# Patient Record
Sex: FEMALE | Race: WHITE | NOT HISPANIC OR LATINO
[De-identification: names, ages, dates, MRNs, and addresses within clinical notes are randomized per-mention and may not be internally consistent; named-entity substitution may affect disease eponyms.]

---

## 2018-05-16 PROBLEM — Z00.00 ENCOUNTER FOR PREVENTIVE HEALTH EXAMINATION: Status: ACTIVE | Noted: 2018-05-16

## 2018-05-23 ENCOUNTER — APPOINTMENT (OUTPATIENT)
Dept: UROLOGY | Facility: CLINIC | Age: 83
End: 2018-05-23
Payer: MEDICARE

## 2018-05-23 VITALS
WEIGHT: 121 LBS | DIASTOLIC BLOOD PRESSURE: 74 MMHG | SYSTOLIC BLOOD PRESSURE: 183 MMHG | HEART RATE: 56 BPM | BODY MASS INDEX: 22.26 KG/M2 | HEIGHT: 62 IN

## 2018-05-23 DIAGNOSIS — R35.1 NOCTURIA: ICD-10-CM

## 2018-05-23 DIAGNOSIS — Z78.9 OTHER SPECIFIED HEALTH STATUS: ICD-10-CM

## 2018-05-23 DIAGNOSIS — R35.0 FREQUENCY OF MICTURITION: ICD-10-CM

## 2018-05-23 LAB
BILIRUB UR QL STRIP: NORMAL
CLARITY UR: CLEAR
COLLECTION METHOD: NORMAL
GLUCOSE UR-MCNC: NORMAL
HCG UR QL: 2 EU/DL
HGB UR QL STRIP.AUTO: NORMAL
KETONES UR-MCNC: NORMAL
LEUKOCYTE ESTERASE UR QL STRIP: NORMAL
NITRITE UR QL STRIP: NORMAL
PH UR STRIP: 6.5
PROT UR STRIP-MCNC: NORMAL
SP GR UR STRIP: 1.01

## 2018-05-23 PROCEDURE — 81003 URINALYSIS AUTO W/O SCOPE: CPT | Mod: QW

## 2018-05-23 PROCEDURE — 99213 OFFICE O/P EST LOW 20 MIN: CPT

## 2018-05-23 RX ORDER — METOPROLOL SUCCINATE 100 MG/1
100 TABLET, EXTENDED RELEASE ORAL
Qty: 90 | Refills: 0 | Status: ACTIVE | COMMUNITY
Start: 2018-01-30

## 2018-05-23 RX ORDER — CALCIUM CARB/VIT D3/MINERALS 600 MG-400
600-400 TABLET ORAL
Refills: 0 | Status: ACTIVE | COMMUNITY

## 2018-05-23 RX ORDER — RAMIPRIL 10 MG/1
10 CAPSULE ORAL
Qty: 90 | Refills: 0 | Status: ACTIVE | COMMUNITY
Start: 2018-01-30

## 2018-05-23 RX ORDER — SOLIFENACIN SUCCINATE 10 MG/1
10 TABLET, FILM COATED ORAL
Qty: 90 | Refills: 0 | Status: ACTIVE | COMMUNITY
Start: 2017-01-31

## 2018-05-23 RX ORDER — ATORVASTATIN CALCIUM 20 MG/1
20 TABLET, FILM COATED ORAL
Qty: 90 | Refills: 0 | Status: ACTIVE | COMMUNITY
Start: 2017-09-05

## 2018-08-22 ENCOUNTER — APPOINTMENT (OUTPATIENT)
Dept: UROLOGY | Facility: CLINIC | Age: 83
End: 2018-08-22
Payer: MEDICARE

## 2018-08-22 VITALS
WEIGHT: 121 LBS | SYSTOLIC BLOOD PRESSURE: 189 MMHG | HEIGHT: 62 IN | BODY MASS INDEX: 22.26 KG/M2 | HEART RATE: 56 BPM | DIASTOLIC BLOOD PRESSURE: 83 MMHG

## 2018-08-22 DIAGNOSIS — N32.81 OVERACTIVE BLADDER: ICD-10-CM

## 2018-08-22 LAB
BILIRUB UR QL STRIP: NORMAL
CLARITY UR: CLEAR
COLLECTION METHOD: NORMAL
GLUCOSE UR-MCNC: NORMAL
HCG UR QL: NORMAL EU/DL
HGB UR QL STRIP.AUTO: NORMAL
KETONES UR-MCNC: NORMAL
LEUKOCYTE ESTERASE UR QL STRIP: NORMAL
NITRITE UR QL STRIP: NORMAL
PH UR STRIP: 7
PROT UR STRIP-MCNC: NORMAL
SP GR UR STRIP: 1

## 2018-08-22 PROCEDURE — 99213 OFFICE O/P EST LOW 20 MIN: CPT

## 2018-08-22 PROCEDURE — 81003 URINALYSIS AUTO W/O SCOPE: CPT | Mod: QW

## 2018-08-22 PROCEDURE — 51798 US URINE CAPACITY MEASURE: CPT

## 2018-10-14 ENCOUNTER — INPATIENT (INPATIENT)
Facility: HOSPITAL | Age: 83
LOS: 17 days | Discharge: NURSING, REHAB OR LONG-TERM CARE | End: 2018-11-01
Attending: INTERNAL MEDICINE | Admitting: INTERNAL MEDICINE

## 2018-10-14 VITALS
RESPIRATION RATE: 18 BRPM | OXYGEN SATURATION: 96 % | SYSTOLIC BLOOD PRESSURE: 141 MMHG | TEMPERATURE: 99 F | DIASTOLIC BLOOD PRESSURE: 63 MMHG | HEART RATE: 74 BPM

## 2018-10-14 DIAGNOSIS — R09.89 OTHER SPECIFIED SYMPTOMS AND SIGNS INVOLVING THE CIRCULATORY AND RESPIRATORY SYSTEMS: ICD-10-CM

## 2018-10-14 LAB
ALBUMIN SERPL ELPH-MCNC: 3.1 G/DL — LOW (ref 3.5–5.2)
ALP SERPL-CCNC: 77 U/L — SIGNIFICANT CHANGE UP (ref 30–115)
ALT FLD-CCNC: 15 U/L — SIGNIFICANT CHANGE UP (ref 0–41)
ANION GAP SERPL CALC-SCNC: 13 MMOL/L — SIGNIFICANT CHANGE UP (ref 7–14)
APPEARANCE UR: ABNORMAL
AST SERPL-CCNC: 14 U/L — SIGNIFICANT CHANGE UP (ref 0–41)
BACTERIA # UR AUTO: ABNORMAL /HPF
BASOPHILS # BLD AUTO: 0.02 K/UL — SIGNIFICANT CHANGE UP (ref 0–0.2)
BASOPHILS NFR BLD AUTO: 0.3 % — SIGNIFICANT CHANGE UP (ref 0–1)
BILIRUB SERPL-MCNC: 0.4 MG/DL — SIGNIFICANT CHANGE UP (ref 0.2–1.2)
BILIRUB UR-MCNC: NEGATIVE — SIGNIFICANT CHANGE UP
BUN SERPL-MCNC: 12 MG/DL — SIGNIFICANT CHANGE UP (ref 10–20)
CALCIUM SERPL-MCNC: 8.5 MG/DL — SIGNIFICANT CHANGE UP (ref 8.5–10.1)
CHLORIDE SERPL-SCNC: 92 MMOL/L — LOW (ref 98–110)
CO2 SERPL-SCNC: 24 MMOL/L — SIGNIFICANT CHANGE UP (ref 17–32)
COLOR SPEC: YELLOW — SIGNIFICANT CHANGE UP
CREAT SERPL-MCNC: 0.5 MG/DL — LOW (ref 0.7–1.5)
DIFF PNL FLD: NEGATIVE — SIGNIFICANT CHANGE UP
EOSINOPHIL # BLD AUTO: 0 K/UL — SIGNIFICANT CHANGE UP (ref 0–0.7)
EOSINOPHIL NFR BLD AUTO: 0 % — SIGNIFICANT CHANGE UP (ref 0–8)
EPI CELLS # UR: ABNORMAL /HPF
ERYTHROCYTE [SEDIMENTATION RATE] IN BLOOD: 61 MM/HR — HIGH (ref 0–20)
GLUCOSE SERPL-MCNC: 173 MG/DL — HIGH (ref 70–99)
GLUCOSE UR QL: NEGATIVE MG/DL — SIGNIFICANT CHANGE UP
HCT VFR BLD CALC: 35.8 % — LOW (ref 37–47)
HGB BLD-MCNC: 12.4 G/DL — SIGNIFICANT CHANGE UP (ref 12–16)
IMM GRANULOCYTES NFR BLD AUTO: 1.1 % — HIGH (ref 0.1–0.3)
KETONES UR-MCNC: NEGATIVE — SIGNIFICANT CHANGE UP
LEUKOCYTE ESTERASE UR-ACNC: NEGATIVE — SIGNIFICANT CHANGE UP
LYMPHOCYTES # BLD AUTO: 0.53 K/UL — LOW (ref 1.2–3.4)
LYMPHOCYTES # BLD AUTO: 8.6 % — LOW (ref 20.5–51.1)
MAGNESIUM SERPL-MCNC: 2.2 MG/DL — SIGNIFICANT CHANGE UP (ref 1.8–2.4)
MCHC RBC-ENTMCNC: 28.8 PG — SIGNIFICANT CHANGE UP (ref 27–31)
MCHC RBC-ENTMCNC: 34.6 G/DL — SIGNIFICANT CHANGE UP (ref 32–37)
MCV RBC AUTO: 83.3 FL — SIGNIFICANT CHANGE UP (ref 81–99)
MONOCYTES # BLD AUTO: 0.7 K/UL — HIGH (ref 0.1–0.6)
MONOCYTES NFR BLD AUTO: 11.4 % — HIGH (ref 1.7–9.3)
NEUTROPHILS # BLD AUTO: 4.83 K/UL — SIGNIFICANT CHANGE UP (ref 1.4–6.5)
NEUTROPHILS NFR BLD AUTO: 78.6 % — HIGH (ref 42.2–75.2)
NITRITE UR-MCNC: NEGATIVE — SIGNIFICANT CHANGE UP
NRBC # BLD: 0 /100 WBCS — SIGNIFICANT CHANGE UP (ref 0–0)
PH UR: 7 — SIGNIFICANT CHANGE UP (ref 5–8)
PLATELET # BLD AUTO: 287 K/UL — SIGNIFICANT CHANGE UP (ref 130–400)
POTASSIUM SERPL-MCNC: 3.8 MMOL/L — SIGNIFICANT CHANGE UP (ref 3.5–5)
POTASSIUM SERPL-SCNC: 3.8 MMOL/L — SIGNIFICANT CHANGE UP (ref 3.5–5)
PROT SERPL-MCNC: 5.6 G/DL — LOW (ref 6–8)
PROT UR-MCNC: NEGATIVE MG/DL — SIGNIFICANT CHANGE UP
RBC # BLD: 4.3 M/UL — SIGNIFICANT CHANGE UP (ref 4.2–5.4)
RBC # FLD: 13.5 % — SIGNIFICANT CHANGE UP (ref 11.5–14.5)
SODIUM SERPL-SCNC: 129 MMOL/L — LOW (ref 135–146)
SP GR SPEC: 1.01 — SIGNIFICANT CHANGE UP (ref 1.01–1.03)
UROBILINOGEN FLD QL: 1 MG/DL (ref 0.2–0.2)
WBC # BLD: 6.15 K/UL — SIGNIFICANT CHANGE UP (ref 4.8–10.8)
WBC # FLD AUTO: 6.15 K/UL — SIGNIFICANT CHANGE UP (ref 4.8–10.8)

## 2018-10-14 RX ORDER — HEPARIN SODIUM 5000 [USP'U]/ML
5000 INJECTION INTRAVENOUS; SUBCUTANEOUS EVERY 8 HOURS
Qty: 0 | Refills: 0 | Status: DISCONTINUED | OUTPATIENT
Start: 2018-10-14 | End: 2018-11-01

## 2018-10-14 RX ORDER — ATORVASTATIN CALCIUM 80 MG/1
1 TABLET, FILM COATED ORAL
Qty: 0 | Refills: 0 | COMMUNITY

## 2018-10-14 RX ORDER — DOCUSATE SODIUM 100 MG
1 CAPSULE ORAL
Qty: 0 | Refills: 0 | COMMUNITY

## 2018-10-14 RX ORDER — METOPROLOL TARTRATE 50 MG
1 TABLET ORAL
Qty: 0 | Refills: 0 | COMMUNITY

## 2018-10-14 RX ORDER — DOCUSATE SODIUM 100 MG
100 CAPSULE ORAL
Qty: 0 | Refills: 0 | Status: DISCONTINUED | OUTPATIENT
Start: 2018-10-14 | End: 2018-10-27

## 2018-10-14 RX ORDER — METOPROLOL TARTRATE 50 MG
50 TABLET ORAL DAILY
Qty: 0 | Refills: 0 | Status: DISCONTINUED | OUTPATIENT
Start: 2018-10-14 | End: 2018-11-01

## 2018-10-14 RX ORDER — ASPIRIN/CALCIUM CARB/MAGNESIUM 324 MG
1 TABLET ORAL
Qty: 0 | Refills: 0 | COMMUNITY

## 2018-10-14 RX ORDER — ATORVASTATIN CALCIUM 80 MG/1
40 TABLET, FILM COATED ORAL AT BEDTIME
Qty: 0 | Refills: 0 | Status: DISCONTINUED | OUTPATIENT
Start: 2018-10-14 | End: 2018-11-01

## 2018-10-14 RX ORDER — LISINOPRIL 2.5 MG/1
40 TABLET ORAL DAILY
Qty: 0 | Refills: 0 | Status: DISCONTINUED | OUTPATIENT
Start: 2018-10-14 | End: 2018-11-01

## 2018-10-14 RX ORDER — ASPIRIN/CALCIUM CARB/MAGNESIUM 324 MG
81 TABLET ORAL DAILY
Qty: 0 | Refills: 0 | Status: DISCONTINUED | OUTPATIENT
Start: 2018-10-14 | End: 2018-11-01

## 2018-10-14 RX ADMIN — ATORVASTATIN CALCIUM 40 MILLIGRAM(S): 80 TABLET, FILM COATED ORAL at 21:31

## 2018-10-14 RX ADMIN — HEPARIN SODIUM 5000 UNIT(S): 5000 INJECTION INTRAVENOUS; SUBCUTANEOUS at 21:31

## 2018-10-14 RX ADMIN — HEPARIN SODIUM 5000 UNIT(S): 5000 INJECTION INTRAVENOUS; SUBCUTANEOUS at 14:03

## 2018-10-14 RX ADMIN — Medication 81 MILLIGRAM(S): at 12:07

## 2018-10-14 NOTE — H&P ADULT - HISTORY OF PRESENT ILLNESS
89 Y/O was in [facility] (((I went to see the patient this night 4:45 am, patient is barely responsive, everything written here is the information I got from the old chart from the [facility].)))    88 Y/O was in [facility] with PMH HTN, Hyperlipidemia with a c/o of slurred speech, personality changes and 2 falls. CT head showed decreased density in the right frontal white matter which is likely related to chronic ischemic changes.    CT angiogram was unremarkable    MRI of the brain shows nultifocal signal abnormalities with suggestion of the cortical involvement. Findings are atypical for small vessel disease. Inflammatory etiology should be suspected. (((I went to see the patient this night 4:45 am, patient is barely responsive, everything written here is the information I got from the old chart from the [facility].)))(Talked with the daughter also regarding this.)    89 Y/O  with PMH of HTN, Hyperlipidemia went to the ED of [facility] for constipation. Manual disimpaction was done and patient was sent home. Next day patient's personality started changing, Became angry on , a bit combative, went to pmd where ct head was ordered. The Ct showed concern for a brain mass. She was readmitted to [facility] for further workup. Repeat CT head: decreased density in right frontal white matter which is likely related to chronic ischemic change. CT angiogram was unremarkable  MRI of the brain showed multifocal subcortical signal abnormalities with suggestion of cortical involvement. Findings are atypical for small vessel disease. Inflammatory etiology should be suspected. No mass.   urinalysis was done. Mildly positive for uti. She was started on levofloxacin but it was stopped due to a negative urine culture.   Diagnosis of metabolic encephalopathy prob 2/2 to uti was made. slurred speech resolved. (possible tia) , hypokalemia was corrected. LP was done but result has not been mentioned in papers. ECHO showed EF 75-80%, mod AS, mild AR, TR. EEG showed generalized slowing   As per daughter before this episode patient was AAOx3 and they were not satisfied with the workup in [facility] so she was transferred to [facility].

## 2018-10-14 NOTE — H&P ADULT - ASSESSMENT
89 y/o f is here is here from [facility] for constipation and developed altered mental status.    AMS 2/2 metabolic encephalopathy vs severe depression  -ct scan, ct angio and mri negative for mass  -repeat urinalysis and urine culture  -repeat blood culture  -repeat cxr  -psychology consult.  -neurology consult for ams  -EEG  -check ESR, CRP and ANDRIY  -FTA-ABS is negative  -f/u [facility] for LP and other results    DLD  -c/w atorvastatin    constipation  -c/w docusate    HTN  -controlled. c/w lisinopril and metoprolol    DVT ppx  -hep sub q

## 2018-10-14 NOTE — H&P ADULT - ATTENDING COMMENTS
Patient seen and examined independently. Resident's H & P reviewed. Agree with the findings and plan of care except,    An 89 years old female was transferred from the [facility]. Pt was admitted over there for altered mental status and had complete W/U done including LP. Old record from [facility] reviewed. Only +ve finding was questionable inflammation on brain MRI.    Today O/E: AA+Ox3  CBC wnl. BMP show hyponatremia    ASSESSMENT:    1. Encephalopathy unspecified: resolving  2. Hyponatremia  3. HTN/DL    PLAN:    . Neuro eval  . F/U on the LP test results done in [facility]  . Rehab eval  . CXR  . OOB to chair  . Plan of care D/W the family in detail

## 2018-10-14 NOTE — PROGRESS NOTE ADULT - ASSESSMENT
(((I went to see the patient this night 4:45 am, patient is barely responsive, everything written here is the information I got from the old chart from the [facility].)))(Talked with the daughter also regarding this.)    89 Y/O  with PMH of HTN, Hyperlipidemia went to the ED of [facility] for constipation. Manual disimpaction was done and patient was sent home. Next day patient's personality started changing, Became angry on , a bit combative, went to pmd where ct head was ordered. The Ct showed concern for a brain mass. She was readmitted to [facility] for further workup. Repeat CT head: decreased density in right frontal white matter which is likely related to chronic ischemic change. CT angiogram was unremarkable  MRI of the brain showed multifocal subcortical signal abnormalities with suggestion of cortical involvement. Findings are atypical for small vessel disease. Inflammatory etiology should be suspected. No mass.   urinalysis was done. Mildly positive for uti. She was started on levofloxacin but it was stopped due to a negative urine culture.   Diagnosis of metabolic encephalopathy prob 2/2 to uti was made. slurred speech resolved. (possible tia) , hypokalemia was corrected. LP was done but result has not been mentioned in papers. ECHO showed EF 75-80%, mod AS, mild AR, TR. EEG showed generalized slowing   As per daughter before this episode patient was AAOx3 and they were not satisfied with the workup in [facility] so she was transferred to [facility]. (14 Oct 2018 04:53)      #AMS 2/2 metabolic encephalopathy vs severe depression vs CVA vs  seizure  -ct scan, ct angio and mri negative for mass  -repeat urinalysis and urine culture  -repeat blood culture  -repeat cxr  -psychology consult.  -neurology consult for ams  -EEG  -check ESR, CRP and ANDRIY  -FTA-ABS is negative  -f/u [facility] for LP and other results    #DLD  -c/w atorvastatin    constipation  -c/w docusate    HTN  -controlled. c/w lisinopril and metoprolol    DVT ppx  -hep sub q (((I went to see the patient this night 4:45 am, patient is barely responsive, everything written here is the information I got from the old chart from the [facility].)))(Talked with the daughter also regarding this.)    89 Y/O  with PMH of HTN, Hyperlipidemia went to the ED of [facility] for constipation. Manual disimpaction was done and patient was sent home. Next day patient's personality started changing, Became angry on , a bit combative, went to pmd where ct head was ordered. The Ct showed concern for a brain mass. She was readmitted to [facility] for further workup. Repeat CT head: decreased density in right frontal white matter which is likely related to chronic ischemic change. CT angiogram was unremarkable  MRI of the brain showed multifocal subcortical signal abnormalities with suggestion of cortical involvement. Findings are atypical for small vessel disease. Inflammatory etiology should be suspected. No mass.   urinalysis was done. Mildly positive for uti. She was started on levofloxacin but it was stopped due to a negative urine culture.   Diagnosis of metabolic encephalopathy prob 2/2 to uti was made. slurred speech resolved. (possible tia) , hypokalemia was corrected. LP was done but result has not been mentioned in papers. ECHO showed EF 75-80%, mod AS, mild AR, TR. EEG showed generalized slowing   As per daughter before this episode patient was AAOx3 and they were not satisfied with the workup in [facility] so she was transferred to [facility]. (14 Oct 2018 04:53)      #AMS 2/2 metabolic encephalopathy vs severe depression vs CVA vs  seizure  -ct scan, ct angio and mri negative for mass  -repeat urinalysis and urine culture  -repeat blood culture  -repeat cxr  -psychology consult.  -neurology consult for ams  -EEG  -check ESR, CRP and ANDRIY  -FTA-ABS is negative  -f/u [facility] for LP and other results    #DLD  -c/w atorvastatin    constipation  -c/w docusate    HTN  -controlled. c/w lisinopril and metoprolol    DVT ppx  -hep sub q      PMD- Dr. Annmarie Ballard [phone]  Cardiologist - Dr Shah [phone]  ID - Dr. Sue Ferreira [phone]

## 2018-10-14 NOTE — H&P ADULT - NSHPLABSRESULTS_GEN_ALL_CORE
10/12/2018.    HB - 12.9  WBC- 7.7  PLT- 274    Na - 133  K- 3.8  CL - 97  CO2 - 26  BUN - 13  CREATININE - 0.4  GLUCOSE - 87

## 2018-10-14 NOTE — PROGRESS NOTE ADULT - SUBJECTIVE AND OBJECTIVE BOX
SUBJECTIVE:  Patient is a 89y old Female who presents with a chief complaint of altered mental status (14 Oct 2018 04:53)  Currently admitted to medicine with the primary diagnosis of Altered mental status    INTERVAL HISTORY;  Today is hospital day 01 . This morning she is resting comfortably in bed and reports no new issues or overnight events.     PAST MEDICAL & SURGICAL HISTORY  Hyperlipidemia  HTN (hypertension)  No significant past surgical history    SOCIAL HISTORY:  Negative for smoking/alcohol/drug use.     ALLERGIES:  No Known Allergies    MEDICATIONS:  STANDING MEDICATIONS  aspirin  chewable 81 milliGRAM(s) Oral daily  atorvastatin 40 milliGRAM(s) Oral at bedtime  docusate sodium 100 milliGRAM(s) Oral two times a day  heparin  Injectable 5000 Unit(s) SubCutaneous every 8 hours  lisinopril 40 milliGRAM(s) Oral daily  metoprolol succinate ER 50 milliGRAM(s) Oral daily    PRN MEDICATIONS    Home Medications:  aspirin 81 mg oral tablet: 1 tab(s) orally once a day (14 Oct 2018 06:09)  atorvastatin 40 mg oral tablet: 1 tab(s) orally once a day (at bedtime) (14 Oct 2018 06:09)  docusate sodium 100 mg oral tablet: 1 tab(s) orally 2 times a day (14 Oct 2018 06:09)  metoprolol succinate 50 mg oral tablet, extended release: 1 tab(s) orally once a day (14 Oct 2018 06:09)  ramipril 10 mg oral tablet: 1 tab(s) orally 2 times a day (14 Oct 2018 06:09)      VITALS:   T(F): 99.1  HR: 80  BP: 110/55  RR: 19  SpO2: 96%    LABS:                        12.4   6.15  )-----------( 287      ( 14 Oct 2018 12:01 )             35.8     10-14    129<L>  |  92<L>  |  12  ----------------------------<  173<H>  3.8   |  24  |  0.5<L>    Ca    8.5      14 Oct 2018 12:01  Mg     2.2     10-14    TPro  5.6<L>  /  Alb  3.1<L>  /  TBili  0.4  /  DBili  x   /  AST  14  /  ALT  15  /  AlkPhos  77  10-1    Sedimentation Rate, Erythrocyte: 61 mm/hr <H> (10-14-18 @ 12:01)    RADIOLOGY:        PHYSICAL EXAM:  GEN: No acute distress  LUNGS: Clear to auscultation bilaterally   HEART: S1/S2 present. RRR.   ABD: Soft, non-tender, non-distended. Bowel sounds present  EXT: NC/NC/NE/2+PP/TEJEDA/Skin Intact.   NEURO: AAOX3, strength 4/5 Right upper extremity, Right arm drift??, left sided facial droop, bilateral plantar response, intact sensation bilaterally. LE - pt is only moving legs and slightly lifts the legs but does not follow commands. Horizontal eye movements - limited eye movement to left on horizontal plane, vertical movements are fine. PERRLA,

## 2018-10-14 NOTE — H&P ADULT - NSHPPHYSICALEXAM_GEN_ALL_CORE
T(F): --99.2  HR: --74  BP: --141/63  RR: -- 20  SpO2: -- 96 %  PHYSICAL EXAM:  GENERAL: NAD, Delay in thought processing, no signs of respiratory distress  HEAD:  Atraumatic, Normocephalic  EYES: EOMI, PERRLA, conjunctiva and sclera clear  CHEST/LUNG: Clear to auscultation bilaterally; No wheeze; No crackles; No accessory muscles used  HEART: Regular rate and rhythm; No murmurs;   ABDOMEN: Soft, Nontender, Nondistended; No guarding  EXTREMITIES:  No cyanosis or edema  PSYCH: AAOx1  NEUROLOGY: non-focal  SKIN: No rashes or lesions T(F): --99.2  HR: --74  BP: --141/63  RR: -- 20  SpO2: -- 96 %  PHYSICAL EXAM:  GENERAL: NAD, Delay in thought processing, no signs of respiratory distress  HEAD:  Atraumatic, Normocephalic  EYES: EOMI, PERRLA, conjunctiva and sclera clear  CHEST/LUNG: Clear to auscultation bilaterally; No wheeze; No crackles; No accessory muscles used  HEART/CVS: Regular rate and rhythm; No murmurs;   ABDOMEN/GI: Soft, Nontender, Nondistended; No guarding  EXTREMITIES:  No cyanosis or edema  PSYCH: AAOx3  NEUROLOGY: non-focal  SKIN: No rashes or lesions

## 2018-10-15 LAB
CRP SERPL-MCNC: 6.68 MG/DL — HIGH (ref 0–0.4)
T3 SERPL-MCNC: 59 NG/DL — LOW (ref 80–200)
T4 AB SER-ACNC: 7 UG/DL — SIGNIFICANT CHANGE UP (ref 4.6–12)
TSH SERPL-MCNC: 2.94 UIU/ML — SIGNIFICANT CHANGE UP (ref 0.27–4.2)

## 2018-10-15 RX ORDER — POLYETHYLENE GLYCOL 3350 17 G/17G
17 POWDER, FOR SOLUTION ORAL EVERY 12 HOURS
Qty: 0 | Refills: 0 | Status: DISCONTINUED | OUTPATIENT
Start: 2018-10-15 | End: 2018-11-01

## 2018-10-15 RX ORDER — ACETAMINOPHEN 500 MG
650 TABLET ORAL EVERY 6 HOURS
Qty: 0 | Refills: 0 | Status: DISCONTINUED | OUTPATIENT
Start: 2018-10-15 | End: 2018-11-01

## 2018-10-15 RX ADMIN — POLYETHYLENE GLYCOL 3350 17 GRAM(S): 17 POWDER, FOR SOLUTION ORAL at 17:04

## 2018-10-15 RX ADMIN — ATORVASTATIN CALCIUM 40 MILLIGRAM(S): 80 TABLET, FILM COATED ORAL at 21:26

## 2018-10-15 RX ADMIN — Medication 650 MILLIGRAM(S): at 06:31

## 2018-10-15 RX ADMIN — HEPARIN SODIUM 5000 UNIT(S): 5000 INJECTION INTRAVENOUS; SUBCUTANEOUS at 13:05

## 2018-10-15 RX ADMIN — HEPARIN SODIUM 5000 UNIT(S): 5000 INJECTION INTRAVENOUS; SUBCUTANEOUS at 21:26

## 2018-10-15 RX ADMIN — Medication 650 MILLIGRAM(S): at 07:39

## 2018-10-15 RX ADMIN — Medication 50 MILLIGRAM(S): at 05:59

## 2018-10-15 RX ADMIN — HEPARIN SODIUM 5000 UNIT(S): 5000 INJECTION INTRAVENOUS; SUBCUTANEOUS at 05:59

## 2018-10-15 RX ADMIN — Medication 81 MILLIGRAM(S): at 11:01

## 2018-10-15 RX ADMIN — Medication 100 MILLIGRAM(S): at 17:04

## 2018-10-15 RX ADMIN — LISINOPRIL 40 MILLIGRAM(S): 2.5 TABLET ORAL at 05:59

## 2018-10-15 NOTE — PROGRESS NOTE ADULT - ASSESSMENT
#AMS 2/2 metabolic encephalopathy vs severe depression vs CVA vs  seizure  -ct scan, ct angio and mri negative for mass - ALL DONE AT [facility]  -urinalysis is negative for acute uti 10/14  -blood culture - Pending  -ESR 61, CRP 6.68  -FTA-ABS is negative  -Serum Na 129 10/14  -psychology consult - they saw the pt 10/14 and would see again once medical causes are excluded  -neurology consult for ams - pending  -EEG - ordered not performed yet  -f/u [facility] for LP and other results    #DLD  -c/w atorvastatin    constipation  -c/w docusate    HTN  -controlled. c/w lisinopril and metoprolol    DVT ppx  -hep sub q      PMD- Dr. Annmarie Ballard [phone]  Cardiologist - Dr Shah [phone]  ID - Dr. Sue Ferreira [phone]

## 2018-10-15 NOTE — SWALLOW BEDSIDE ASSESSMENT ADULT - SWALLOW EVAL: DIAGNOSIS
+moderate oral dysphagia for thin w/ +bolus hold, mild for nectar and puree +toleration of nectar and puree w/ no overt s/s of penetration/aspiration

## 2018-10-15 NOTE — CONSULT NOTE ADULT - ASSESSMENT
Assessment:   An 89 years old female w/ history of HTN, DLD was transferred from [facility]. Pt was admitted over there for altered mental status and had complete W/U done including LP. Old record from [facility] reviewed. Only +ve finding was questionable inflammation on brain MRI. Patient found to have motor deficits in the Left arm and leg compared to the right, however unsure what portion of that is due to current mental status. Workup for metabolic cause for change in mental status.     Plan:   1. f/u UA, Ucx, Bcx   2. f/u EEG   3. check ESR, CRP and ANDRIY, B12, folate, TSH  4. give thiamine   5. f/u [facility] for LP and other results  6. neurology attending to follow

## 2018-10-15 NOTE — SWALLOW BEDSIDE ASSESSMENT ADULT - SLP PERTINENT HISTORY OF CURRENT PROBLEM
Pt admitted from [facility] after w/u for CVA.  CTH: dec density R frontal WM MRI: multifocal subcortical signal abnormalities suggestive of cortical involvement

## 2018-10-15 NOTE — CONSULT NOTE ADULT - SUBJECTIVE AND OBJECTIVE BOX
HPI    89 Y/O  with PMH of HTN, Hyperlipidemia went to the ED of [facility] for constipation. Manual disimpaction was done and patient was sent home. Next day patient's personality started changing, Became angry on , a bit combative, went to pmd where ct head was ordered. The Ct showed concern for a brain mass. She was readmitted to [facility] for further workup. Repeat CT head: decreased density in right frontal white matter which is likely related to chronic ischemic change. CT angiogram was unremarkable  MRI of the brain showed multifocal subcortical signal abnormalities with suggestion of cortical involvement. Findings are atypical for small vessel disease. Inflammatory etiology should be suspected. No mass.   urinalysis was done. Mildly positive for uti. She was started on levofloxacin but it was stopped due to a negative urine culture.   Diagnosis of metabolic encephalopathy prob 2/2 to uti was made. slurred speech resolved. (possible tia) , hypokalemia was corrected. LP was done but result has not been mentioned in papers. ECHO showed EF 75-80%, mod AS, mild AR, TR. EEG showed generalized slowing   As per daughter before this episode patient was AAOx3 and they were not satisfied with the workup in [facility] so she was transferred to [facility]. (14 Oct 2018 04:53)      PAST MEDICAL & SURGICAL HISTORY:  Hyperlipidemia  HTN (hypertension)  No significant past surgical history      Hospital Course: Patient seen by SLP for swallow eval. Rec pureed die t w/ nectar thick liquid sec to dysphagia. Patient has been lethargic but out of bed to chair and reportedly feeding herself.     TODAY'S SUBJECTIVE & REVIEW OF SYMPTOMS: unable sec to lethargy      MEDICATIONS  (STANDING):  aspirin  chewable 81 milliGRAM(s) Oral daily  atorvastatin 40 milliGRAM(s) Oral at bedtime  docusate sodium 100 milliGRAM(s) Oral two times a day  heparin  Injectable 5000 Unit(s) SubCutaneous every 8 hours  lisinopril 40 milliGRAM(s) Oral daily  metoprolol succinate ER 50 milliGRAM(s) Oral daily    MEDICATIONS  (PRN):  acetaminophen   Tablet .. 650 milliGRAM(s) Oral every 6 hours PRN Moderate Pain (4 - 6)      FAMILY HISTORY:  No pertinent family history in first degree relatives: No relavent FHX related to the pt&#x27;s current condition      Allergies    No Known Allergies    Intolerances        SOCIAL HISTORY: unkown. Unable to give history    [  ] Etoh  [  ] Smoking  [  ] Substance abuse     Home Environment:  [  ] Home Alone  [  ] Lives with Family  [  ] Home Health Aid    Dwelling:  [  ] Apartment  [  ] Private House  [  ] Adult Home  [  ] Skilled Nursing Facility      [  ] Short Term  [  ] Long Term  [  ] Stairs       Elevator [  ]    FUNCTIONAL STATUS PTA: (Check all that apply) - unkown  Ambulation: [   ]Independent    [  ] Dependent     [  ] Non-Ambulatory  Assistive Device: [  ] SA Cane  [  ]  Q Cane  [  ] Walker  [  ]  Wheelchair  ADL : [  ] Independent  [  ]  Dependent       Vital Signs Last 24 Hrs  T(C): 36.3 (15 Oct 2018 07:16), Max: 36.7 (15 Oct 2018 00:55)  T(F): 97.3 (15 Oct 2018 07:16), Max: 98.1 (15 Oct 2018 00:55)  HR: 74 (15 Oct 2018 07:16) (74 - 86)  BP: 146/65 (15 Oct 2018 07:16) (135/62 - 179/84)  BP(mean): --  RR: 18 (15 Oct 2018 07:16) (18 - 18)  SpO2: 95% (15 Oct 2018 07:40) (95% - 95%)      PHYSICAL EXAM: Lethargic  GENERAL: thin  HEAD:  Atraumatic, Normocephalic  EYES: EOMI, PERRLA, conjunctiva and sclera clear  NECK: Supple, No JVD,   CHEST/LUNG: Clear to percussion bilaterally; No rales, rhonchi, wheezing, or rubs  HEART: Regular rate and rhythm; No murmurs, rubs, or gallops  ABDOMEN: Soft, Nontender, Nondistended  EXTREMITIES: no edema    NERVOUS SYSTEM:  Cranial Nerves 2-12  grossly intact [x  ] Abnormal  [  ]  ROM: WFL all extremities [x  ]  Abnormal [  ]  Motor Strength: WFL all extremities  [  ]  Abnormal [  ] unable. left side weaker than right  Sensation: intact to light touch [  ] Abnormal [  ] unable  Reflexes: Symmetric [x  ]  Abnormal [  ]    FUNCTIONAL STATUS:  Bed Mobility: Independent [  ]  Supervision [x  ]  Needs Assistance [  ]  N/A [  ]  Transfers: Independent [  ]  Supervision [x  ]  Needs Assistance [  ]  N/A [  ]   Ambulation: Independent [  ]  Supervision [x  ]  Needs Assistance [  ]  N/A [  ]  ADL: Independent [  ] Requires Assistance [ x ] N/A [  ]      LABS:                        12.4   6.15  )-----------( 287      ( 14 Oct 2018 12:01 )             35.8     10-14    129<L>  |  92<L>  |  12  ----------------------------<  173<H>  3.8   |  24  |  0.5<L>    Ca    8.5      14 Oct 2018 12:01  Mg     2.2     10-14    TPro  5.6<L>  /  Alb  3.1<L>  /  TBili  0.4  /  DBili  x   /  AST  14  /  ALT  15  /  AlkPhos  77  10-14      Urinalysis Basic - ( 14 Oct 2018 22:02 )    Color: Yellow / Appearance: Cloudy / S.015 / pH: x  Gluc: x / Ketone: Negative  / Bili: Negative / Urobili: 1.0 mg/dL   Blood: x / Protein: Negative mg/dL / Nitrite: Negative   Leuk Esterase: Negative / RBC: x / WBC x   Sq Epi: x / Non Sq Epi: Moderate /HPF / Bacteria: Few /HPF        RADIOLOGY & ADDITIONAL STUDIES:    Assesment:

## 2018-10-15 NOTE — PROGRESS NOTE ADULT - SUBJECTIVE AND OBJECTIVE BOX
(((I went to see the patient this night 4:45 am, patient is barely responsive, everything written here is the information I got from the old chart from the [facility].)))(Talked with the daughter also regarding this.)    89 Y/O  with PMH of HTN, Hyperlipidemia went to the ED of [facility] for constipation. Manual disimpaction was done and patient was sent home. Next day patient's personality started changing, Became angry on , a bit combative, went to pmd where ct head was ordered. The Ct showed concern for a brain mass. She was readmitted to [facility] for further workup. Repeat CT head: decreased density in right frontal white matter which is likely related to chronic ischemic change. CT angiogram was unremarkable  MRI of the brain showed multifocal subcortical signal abnormalities with suggestion of cortical involvement. Findings are atypical for small vessel disease. Inflammatory etiology should be suspected. No mass.   urinalysis was done. Mildly positive for uti. She was started on levofloxacin but it was stopped due to a negative urine culture.   Diagnosis of metabolic encephalopathy prob 2/2 to uti was made. slurred speech resolved. (possible tia) , hypokalemia was corrected. LP was done but result has not been mentioned in papers. ECHO showed EF 75-80%, mod AS, mild AR, TR. EEG showed generalized slowing   As per daughter before this episode patient was AAOx3 and they were not satisfied with the workup in [facility] so she was transferred to [facility]. (14 Oct 2018 04:53)    SUBJECTIVE:  Patient is a 89y old Female who presented with a chief complaint of altered mental status (15 Oct 2018 10:50)  Currently admitted to medicine with the primary diagnosis of Altered mental status likely 2/2 CVA vs metabolic encephalopathy      INTERVAL HISTORY;  Today is hospital day 2d. This morning she is resting comfortably in chair and reports no new issues or overnight events.     PAST MEDICAL & SURGICAL HISTORY  Hyperlipidemia  HTN (hypertension)  No significant past surgical history    SOCIAL HISTORY:  Negative for smoking/alcohol/drug use.     ALLERGIES:  No Known Allergies    MEDICATIONS:  STANDING MEDICATIONS  aspirin  chewable 81 milliGRAM(s) Oral daily  atorvastatin 40 milliGRAM(s) Oral at bedtime  docusate sodium 100 milliGRAM(s) Oral two times a day  heparin  Injectable 5000 Unit(s) SubCutaneous every 8 hours  lisinopril 40 milliGRAM(s) Oral daily  metoprolol succinate ER 50 milliGRAM(s) Oral daily    PRN MEDICATIONS  acetaminophen   Tablet .. 650 milliGRAM(s) Oral every 6 hours PRN    Home Medications:  aspirin 81 mg oral tablet: 1 tab(s) orally once a day (14 Oct 2018 06:09)  atorvastatin 40 mg oral tablet: 1 tab(s) orally once a day (at bedtime) (14 Oct 2018 06:09)  docusate sodium 100 mg oral tablet: 1 tab(s) orally 2 times a day (14 Oct 2018 06:09)  metoprolol succinate 50 mg oral tablet, extended release: 1 tab(s) orally once a day (14 Oct 2018 06:09)  ramipril 10 mg oral tablet: 1 tab(s) orally 2 times a day (14 Oct 2018 06:09)      VITALS:   T(F): 97.3  HR: 74  BP: 146/65  RR: 18  SpO2: 95%    LABS:                        12.4   6.15  )-----------( 287      ( 14 Oct 2018 12:01 )             35.8     10-14    129<L>  |  92<L>  |  12  ----------------------------<  173<H>  3.8   |  24  |  0.5<L>    Ca    8.5      14 Oct 2018 12:01  Mg     2.2     10-14    TPro  5.6<L>  /  Alb  3.1<L>  /  TBili  0.4  /  DBili  x   /  AST  14  /  ALT  15  /  AlkPhos  77  10-14      Urinalysis Basic - ( 14 Oct 2018 22:02 )    Color: Yellow / Appearance: Cloudy / S.015 / pH: x  Gluc: x / Ketone: Negative  / Bili: Negative / Urobili: 1.0 mg/dL   Blood: x / Protein: Negative mg/dL / Nitrite: Negative   Leuk Esterase: Negative / RBC: x / WBC x   Sq Epi: x / Non Sq Epi: Moderate /HPF / Bacteria: Few /HPF      RADIOLOGY:  < from: Xray Chest 1 View- PORTABLE-Routine (10.14.18 @ 10:39) >  Impression:    No radiographic evidence of acute cardiopulmonary disease.        PHYSICAL EXAM:  GEN: No acute distress  LUNGS: Clear to auscultation bilaterally   HEART: S1/S2 present. RRR.   ABD: Soft, non-tender, non-distended. Bowel sounds present  EXT: NC/NC/NE/2+PP/TEJEDA/Skin Intact.   NEURO: AAOX3  Cranial nerves: intact VA, VFF.  EOMI w/o nystagmus,  PERRL.  No ptosis/weakness of eyelid closure.  Left sided facial droop.    Palate elevates midline.  Tongue midline.  Motor examination:   Normal tone, bulk and range of motion.  No tenderness, twitching, tremors or involuntary movements.  Formal Muscle Strength Testing: patient weaker throughout entire left side, 4/5 in Left arm and leg and weaker grasp   Sensory examination: Intact to light touch and pinprick, pain, temperature and proprioception and vibration in all extremities.  Cerebellum: unable to follow commands

## 2018-10-15 NOTE — SWALLOW BEDSIDE ASSESSMENT ADULT - PHARYNGEAL PHASE
Within functional limits Delayed pharyngeal swallow Delayed pharyngeal swallow/Delayed throat clear post oral intake

## 2018-10-15 NOTE — SWALLOW BEDSIDE ASSESSMENT ADULT - COMMENTS
Mild oral dysphagia w/o overt s/s of penetration/aspiration Moderate oral dysphagia w/ min overt s/s of penetration/aspiration

## 2018-10-15 NOTE — CONSULT NOTE ADULT - ASSESSMENT
IMPRESSION: Rehab of change in mental status, left hemiparesis, encephalopathy- r/o infectious vs metabolic vs inflammatory.                      Dysphagia- cleared for pureed food na nectar-thick liquid    PRECAUTIONS: [  ] Cardiac  [  ] Respiratory  [  ] Seizures [  ] Contact Isolation  [  ] Droplet Isolation  [  ] Other    Weight Bearing Status:     RECOMMENDATION:    Out of Bed to Chair     DVT/Decubiti Prophylaxis    REHAB PLAN:     [ x  ] Bedside P/T 3-5 times a week   [   ]   Bedside O/T  2-3 times a week             [   ] No Rehab Therapy Indicated                   [   ]  Speech Therapy   Conditioning/ROM                                    ADL  Bed Mobility                                               Conditioning/ROM  Transfers                                                     Bed Mobility  Sitting /Standing Balance                         Transfers                                        Gait Training                                               Sitting/Standing Balance  Stair Training [   ]Applicable                    Home equipment Eval                                                                        Splinting  [   ] Only      GOALS:   ADL   [   ]   Independent                    Transfers  [   ] Independent                          Ambulation  [   ] Independent     [    ] With device                            [   ]  CG                                                         [   ]  CG                                                                  [   ] CG                            [ x   ] Min A                                                   [ x  ] Min A                                                              [ x  ] Min  A          DISCHARGE PLAN:   [   ]  Good candidate for Intensive Rehabilitation/Hospital based-4A [facility]                                             Will tolerate 3hrs Intensive Rehab Daily                                       [ x   ]  Short Term Rehab in Skilled Nursing Facility                            vs           [  x  ]  Home with Outpatient or VN services                                         [    ]  Possible Candidate for Intensive Hospital based Rehab

## 2018-10-15 NOTE — CONSULT NOTE ADULT - SUBJECTIVE AND OBJECTIVE BOX
Neurology Consult    HPI:  (((I went to see the patient this night 4:45 am, patient is barely responsive, everything written here is the information I got from the old chart from the [facility].)))(Talked with the daughter also regarding this.)    88 Y/O  with PMH of HTN, Hyperlipidemia went to the ED of [facility] for constipation. Manual disimpaction was done and patient was sent home. Next day patient's personality started changing, Became angry on , a bit combative, went to pmd where ct head was ordered. The Ct showed concern for a brain mass. She was readmitted to [facility] for further workup. Repeat CT head: decreased density in right frontal white matter which is likely related to chronic ischemic change. CT angiogram was unremarkable  MRI of the brain showed multifocal subcortical signal abnormalities with suggestion of cortical involvement. Findings are atypical for small vessel disease. Inflammatory etiology should be suspected. No mass.   urinalysis was done. Mildly positive for uti. She was started on levofloxacin but it was stopped due to a negative urine culture.   Diagnosis of metabolic encephalopathy prob 2/2 to uti was made. slurred speech resolved. (possible tia) , hypokalemia was corrected. LP was done but result has not been mentioned in papers. ECHO showed EF 75-80%, mod AS, mild AR, TR. EEG showed generalized slowing   As per daughter before this episode patient was AAOx3 and they were not satisfied with the workup in [facility] so she was transferred to [facility]. (14 Oct 2018 04:53)      Upon neurological evaluation, patient seen in bed, calm and lethargic. Patient oriented to self however otherwise confused. Patient responds to some questions, than will stop responding or go completely quite for remainder of questions. Patient reports that she is constipated. Patient denies any other new complaints. No reported events while in the hospital. Patient not able to fully cooperate with exam.       PAST MEDICAL & SURGICAL HISTORY:  Hyperlipidemia  HTN (hypertension)  No significant past surgical history      FAMILY HISTORY:  No pertinent family history in first degree relatives: No relavent FHX related to the pt&#x27;s current condition      Social History: (-) x 3    Allergies    No Known Allergies    Intolerances        MEDICATIONS  (STANDING):  aspirin  chewable 81 milliGRAM(s) Oral daily  atorvastatin 40 milliGRAM(s) Oral at bedtime  docusate sodium 100 milliGRAM(s) Oral two times a day  heparin  Injectable 5000 Unit(s) SubCutaneous every 8 hours  lisinopril 40 milliGRAM(s) Oral daily  metoprolol succinate ER 50 milliGRAM(s) Oral daily    MEDICATIONS  (PRN):  acetaminophen   Tablet .. 650 milliGRAM(s) Oral every 6 hours PRN Moderate Pain (4 - 6)      Review of systems:    Constitutional: No fever, weight loss or fatigue    Eyes: No eye pain or discharge  ENMT:  No difficulty hearing; No sinus or throat pain  Neck: No pain or stiffness  Respiratory: No cough, wheezing, chills or hemoptysis  Cardiovascular: No chest pain, palpitations, shortness of breath, dyspnea on exertion  Gastrointestinal: No abdominal pain, nausea, vomiting or hematemesis; No diarrhea or constipation.   Genitourinary: No dysuria, frequency, hematuria or incontinence  Neurological: As per HPI  Skin: No rashes or lesions   Endocrine: No heat or cold intolerance; No hair loss  Musculoskeletal: No joint pain or swelling  Psychiatric: No depression, anxiety, mood swings  Heme/Lymph: No easy bruising or bleeding gums    Vital Signs Last 24 Hrs  T(C): 36.3 (15 Oct 2018 07:16), Max: 36.9 (14 Oct 2018 15:38)  T(F): 97.3 (15 Oct 2018 07:16), Max: 98.5 (14 Oct 2018 15:38)  HR: 74 (15 Oct 2018 07:16) (71 - 86)  BP: 146/65 (15 Oct 2018 07:16) (110/55 - 179/84)  BP(mean): --  RR: 18 (15 Oct 2018 07:16) (18 - 18)  SpO2: 95% (15 Oct 2018 07:40) (95% - 95%)    Neurologic Examination:  General:  Appearance is consistent with chronologic age.  No abnormal facies.   General: oriented to person only.     Cranial nerves: intact VA, VFF.  EOMI w/o nystagmus,  PERRL.  No ptosis/weakness of eyelid closure.  Left sided facial droop.    Palate elevates midline.  Tongue midline.  Motor examination:   Normal tone, bulk and range of motion.  No tenderness, twitching, tremors or involuntary movements.  Formal Muscle Strength Testing: patient weaker throughout entire left side, 4/5 in Left arm and leg and weaker grasp   Sensory examination:   Intact to light touch and pinprick, pain, temperature and proprioception and vibration in all extremities.  Cerebellum:  unable to follow commands     Labs:   CBC Full  -  ( 14 Oct 2018 12:01 )  WBC Count : 6.15 K/uL  Hemoglobin : 12.4 g/dL  Hematocrit : 35.8 %  Platelet Count - Automated : 287 K/uL  Mean Cell Volume : 83.3 fL  Mean Cell Hemoglobin : 28.8 pg  Mean Cell Hemoglobin Concentration : 34.6 g/dL  Auto Neutrophil # : 4.83 K/uL  Auto Lymphocyte # : 0.53 K/uL  Auto Monocyte # : 0.70 K/uL  Auto Eosinophil # : 0.00 K/uL  Auto Basophil # : 0.02 K/uL  Auto Neutrophil % : 78.6 %  Auto Lymphocyte % : 8.6 %  Auto Monocyte % : 11.4 %  Auto Eosinophil % : 0.0 %  Auto Basophil % : 0.3 %    10    129<L>  |  92<L>  |  12  ----------------------------<  173<H>  3.8   |  24  |  0.5<L>    Ca    8.5      14 Oct 2018 12:01  Mg     2.2     10-14    TPro  5.6<L>  /  Alb  3.1<L>  /  TBili  0.4  /  DBili  x   /  AST  14  /  ALT  15  /  AlkPhos  77  10-14    LIVER FUNCTIONS - ( 14 Oct 2018 12:01 )  Alb: 3.1 g/dL / Pro: 5.6 g/dL / ALK PHOS: 77 U/L / ALT: 15 U/L / AST: 14 U/L / GGT: x             Urinalysis Basic - ( 14 Oct 2018 22:02 )    Color: Yellow / Appearance: Cloudy / S.015 / pH: x  Gluc: x / Ketone: Negative  / Bili: Negative / Urobili: 1.0 mg/dL   Blood: x / Protein: Negative mg/dL / Nitrite: Negative   Leuk Esterase: Negative / RBC: x / WBC x   Sq Epi: x / Non Sq Epi: Moderate /HPF / Bacteria: Few /HPF          Neuroimaging:  see HPI Neurology Consult    HPI:  89 Y/O  with PMH of HTN, Hyperlipidemia went to the ED of [facility] for constipation on Oct 1 2018. Manual disimpaction was done and patient was sent home. Next day patient's personality started changing, Became angry on , a bit combative, went to pmd where ct head was ordered. The Ct showed concern for a brain mass. She was readmitted to [facility] for further workup. Repeat CT head: decreased density in right frontal white matter which is likely related to chronic ischemic change. CT angiogram was unremarkable  MRI of the brain showed multifocal subcortical signal abnormalities with suggestion of cortical involvement. Findings are atypical for small vessel disease. Inflammatory etiology should be suspected. No mass.   urinalysis was done. Mildly positive for uti. She was started on levofloxacin but it was stopped due to a negative urine culture.   Diagnosis of metabolic encephalopathy prob 2/2 to uti was made. slurred speech resolved. (possible tia) , hypokalemia was corrected. LP was done but result has not been mentioned in papers. ECHO showed EF 75-80%, mod AS, mild AR, TR. EEG showed generalized slowing   As per daughter before this episode patient was AAOx3 and they were not satisfied with the workup in [facility] so she was transferred to [facility]. (14 Oct 2018 04:53)    Daughter reports that for 1 month- patient has been getting increasingly irritable, angry- change in personality- worse in past two weeks- to the point- confuses between two daughters- no spontaneous conversation, answers questions- but not always right    history of >20-30 pound weight loss since april, loss of appetite++    Upon neurological evaluation, patient seen in bed, calm and lethargic. Patient oriented to self however otherwise confused. Patient responds to some questions, than will stop responding or go completely quite for remainder of questions. Patient reports that she is constipated. Patient denies any other new complaints. No reported events while in the hospital. Patient not able to fully cooperate with exam.       PAST MEDICAL & SURGICAL HISTORY:  Hyperlipidemia  HTN (hypertension)  No significant past surgical history      FAMILY HISTORY:  No pertinent family history in first degree relatives: No relavent FHX related to the pt&#x27;s current condition        Allergies    No Known Allergies    Intolerances        MEDICATIONS  (STANDING):  aspirin  chewable 81 milliGRAM(s) Oral daily  atorvastatin 40 milliGRAM(s) Oral at bedtime  docusate sodium 100 milliGRAM(s) Oral two times a day  heparin  Injectable 5000 Unit(s) SubCutaneous every 8 hours  lisinopril 40 milliGRAM(s) Oral daily  metoprolol succinate ER 50 milliGRAM(s) Oral daily    MEDICATIONS  (PRN):  acetaminophen   Tablet .. 650 milliGRAM(s) Oral every 6 hours PRN Moderate Pain (4 - 6)      Review of systems:    Constitutional: No fever, weight loss or fatigue    Eyes: No eye pain or discharge  ENMT:  No difficulty hearing; No sinus or throat pain  Neck: No pain or stiffness  Respiratory: No cough, wheezing, chills or hemoptysis  Cardiovascular: No chest pain, palpitations, shortness of breath, dyspnea on exertion  Gastrointestinal: No abdominal pain, nausea, vomiting or hematemesis; No diarrhea or constipation.   Genitourinary: No dysuria, frequency, hematuria or incontinence  Neurological: As per HPI  Skin: No rashes or lesions   Endocrine: No heat or cold intolerance; No hair loss  Musculoskeletal: No joint pain or swelling  Psychiatric: No depression, anxiety, mood swings  Heme/Lymph: No easy bruising or bleeding gums    Vital Signs Last 24 Hrs  T(C): 36.3 (15 Oct 2018 07:16), Max: 36.9 (14 Oct 2018 15:38)  T(F): 97.3 (15 Oct 2018 07:16), Max: 98.5 (14 Oct 2018 15:38)  HR: 74 (15 Oct 2018 07:16) (71 - 86)  BP: 146/65 (15 Oct 2018 07:16) (110/55 - 179/84)  BP(mean): --  RR: 18 (15 Oct 2018 07:16) (18 - 18)  SpO2: 95% (15 Oct 2018 07:40) (95% - 95%)    Neurologic Examination:  General:  Appearance is consistent with chronologic age.  No abnormal facies.   General: oriented to person only. answers questions-keeps looking at the corner of her table  Cranial nerves: EOMI w/o nystagmus,  PERRL.  No ptosis/weakness of eyelid closure.  Tongue midline.  Motor examination:   generalized increased tone and patient resists any passive movement  Formal Muscle Strength Testing: patient weaker throughout entire left side, 4/5 in Left arm and leg and weaker grasp   Sensory examination:  Lt/PP felt elvi  Cerebellum:  unable to follow commands     Labs:   CBC Full  -  ( 14 Oct 2018 12:01 )  WBC Count : 6.15 K/uL  Hemoglobin : 12.4 g/dL  Hematocrit : 35.8 %  Platelet Count - Automated : 287 K/uL  Mean Cell Volume : 83.3 fL  Mean Cell Hemoglobin : 28.8 pg  Mean Cell Hemoglobin Concentration : 34.6 g/dL  Auto Neutrophil # : 4.83 K/uL  Auto Lymphocyte # : 0.53 K/uL  Auto Monocyte # : 0.70 K/uL  Auto Eosinophil # : 0.00 K/uL  Auto Basophil # : 0.02 K/uL  Auto Neutrophil % : 78.6 %  Auto Lymphocyte % : 8.6 %  Auto Monocyte % : 11.4 %  Auto Eosinophil % : 0.0 %  Auto Basophil % : 0.3 %    10    129<L>  |  92<L>  |  12  ----------------------------<  173<H>  3.8   |  24  |  0.5<L>    Ca    8.5      14 Oct 2018 12:01  Mg     2.2     10-14    TPro  5.6<L>  /  Alb  3.1<L>  /  TBili  0.4  /  DBili  x   /  AST  14  /  ALT  15  /  AlkPhos  77  10-14    LIVER FUNCTIONS - ( 14 Oct 2018 12:01 )  Alb: 3.1 g/dL / Pro: 5.6 g/dL / ALK PHOS: 77 U/L / ALT: 15 U/L / AST: 14 U/L / GGT: x             Urinalysis Basic - ( 14 Oct 2018 22:02 )    Color: Yellow / Appearance: Cloudy / S.015 / pH: x  Gluc: x / Ketone: Negative  / Bili: Negative / Urobili: 1.0 mg/dL   Blood: x / Protein: Negative mg/dL / Nitrite: Negative   Leuk Esterase: Negative / RBC: x / WBC x   Sq Epi: x / Non Sq Epi: Moderate /HPF / Bacteria: Few /HPF          Neuroimaging:  see HPI

## 2018-10-15 NOTE — SWALLOW BEDSIDE ASSESSMENT ADULT - ORAL PHASE
Delayed oral transit time/Decreased anterior-posterior movement of the bolus Delayed oral transit time Delayed oral transit time/bolus hold/Decreased anterior-posterior movement of the bolus

## 2018-10-16 LAB
AMMONIA BLD-MCNC: 20 UMOL/L — SIGNIFICANT CHANGE UP (ref 11–55)
ANION GAP SERPL CALC-SCNC: 13 MMOL/L — SIGNIFICANT CHANGE UP (ref 7–14)
BASOPHILS # BLD AUTO: 0.03 K/UL — SIGNIFICANT CHANGE UP (ref 0–0.2)
BASOPHILS NFR BLD AUTO: 0.5 % — SIGNIFICANT CHANGE UP (ref 0–1)
BUN SERPL-MCNC: 20 MG/DL — SIGNIFICANT CHANGE UP (ref 10–20)
CALCIUM SERPL-MCNC: 9.1 MG/DL — SIGNIFICANT CHANGE UP (ref 8.5–10.1)
CHLORIDE SERPL-SCNC: 97 MMOL/L — LOW (ref 98–110)
CO2 SERPL-SCNC: 26 MMOL/L — SIGNIFICANT CHANGE UP (ref 17–32)
CREAT SERPL-MCNC: 0.5 MG/DL — LOW (ref 0.7–1.5)
CULTURE RESULTS: SIGNIFICANT CHANGE UP
EOSINOPHIL # BLD AUTO: 0 K/UL — SIGNIFICANT CHANGE UP (ref 0–0.7)
EOSINOPHIL NFR BLD AUTO: 0 % — SIGNIFICANT CHANGE UP (ref 0–8)
GLUCOSE SERPL-MCNC: 129 MG/DL — HIGH (ref 70–99)
HCT VFR BLD CALC: 33.2 % — LOW (ref 37–47)
HGB BLD-MCNC: 11.1 G/DL — LOW (ref 12–16)
IMM GRANULOCYTES NFR BLD AUTO: 1.5 % — HIGH (ref 0.1–0.3)
LYMPHOCYTES # BLD AUTO: 0.62 K/UL — LOW (ref 1.2–3.4)
LYMPHOCYTES # BLD AUTO: 11.3 % — LOW (ref 20.5–51.1)
MCHC RBC-ENTMCNC: 28.4 PG — SIGNIFICANT CHANGE UP (ref 27–31)
MCHC RBC-ENTMCNC: 33.4 G/DL — SIGNIFICANT CHANGE UP (ref 32–37)
MCV RBC AUTO: 84.9 FL — SIGNIFICANT CHANGE UP (ref 81–99)
MONOCYTES # BLD AUTO: 0.62 K/UL — HIGH (ref 0.1–0.6)
MONOCYTES NFR BLD AUTO: 11.3 % — HIGH (ref 1.7–9.3)
NEUTROPHILS # BLD AUTO: 4.15 K/UL — SIGNIFICANT CHANGE UP (ref 1.4–6.5)
NEUTROPHILS NFR BLD AUTO: 75.4 % — HIGH (ref 42.2–75.2)
NRBC # BLD: 0 /100 WBCS — SIGNIFICANT CHANGE UP (ref 0–0)
PLATELET # BLD AUTO: 326 K/UL — SIGNIFICANT CHANGE UP (ref 130–400)
POTASSIUM SERPL-MCNC: 4.3 MMOL/L — SIGNIFICANT CHANGE UP (ref 3.5–5)
POTASSIUM SERPL-SCNC: 4.3 MMOL/L — SIGNIFICANT CHANGE UP (ref 3.5–5)
RBC # BLD: 3.91 M/UL — LOW (ref 4.2–5.4)
RBC # FLD: 13.7 % — SIGNIFICANT CHANGE UP (ref 11.5–14.5)
SODIUM SERPL-SCNC: 136 MMOL/L — SIGNIFICANT CHANGE UP (ref 135–146)
SPECIMEN SOURCE: SIGNIFICANT CHANGE UP
WBC # BLD: 5.5 K/UL — SIGNIFICANT CHANGE UP (ref 4.8–10.8)
WBC # FLD AUTO: 5.5 K/UL — SIGNIFICANT CHANGE UP (ref 4.8–10.8)

## 2018-10-16 RX ORDER — THIAMINE MONONITRATE (VIT B1) 100 MG
100 TABLET ORAL DAILY
Qty: 0 | Refills: 0 | Status: DISCONTINUED | OUTPATIENT
Start: 2018-10-16 | End: 2018-10-16

## 2018-10-16 RX ORDER — THIAMINE MONONITRATE (VIT B1) 100 MG
100 TABLET ORAL DAILY
Qty: 0 | Refills: 0 | Status: DISCONTINUED | OUTPATIENT
Start: 2018-10-16 | End: 2018-10-19

## 2018-10-16 RX ORDER — LACTULOSE 10 G/15ML
20 SOLUTION ORAL EVERY 4 HOURS
Qty: 0 | Refills: 0 | Status: DISCONTINUED | OUTPATIENT
Start: 2018-10-16 | End: 2018-10-19

## 2018-10-16 RX ORDER — LACTULOSE 10 G/15ML
10 SOLUTION ORAL DAILY
Qty: 0 | Refills: 0 | Status: DISCONTINUED | OUTPATIENT
Start: 2018-10-16 | End: 2018-10-16

## 2018-10-16 RX ADMIN — Medication 100 MILLIGRAM(S): at 17:03

## 2018-10-16 RX ADMIN — HEPARIN SODIUM 5000 UNIT(S): 5000 INJECTION INTRAVENOUS; SUBCUTANEOUS at 05:04

## 2018-10-16 RX ADMIN — LISINOPRIL 40 MILLIGRAM(S): 2.5 TABLET ORAL at 05:03

## 2018-10-16 RX ADMIN — LACTULOSE 20 GRAM(S): 10 SOLUTION ORAL at 17:03

## 2018-10-16 RX ADMIN — POLYETHYLENE GLYCOL 3350 17 GRAM(S): 17 POWDER, FOR SOLUTION ORAL at 05:03

## 2018-10-16 RX ADMIN — HEPARIN SODIUM 5000 UNIT(S): 5000 INJECTION INTRAVENOUS; SUBCUTANEOUS at 13:08

## 2018-10-16 RX ADMIN — Medication 81 MILLIGRAM(S): at 11:01

## 2018-10-16 RX ADMIN — POLYETHYLENE GLYCOL 3350 17 GRAM(S): 17 POWDER, FOR SOLUTION ORAL at 17:03

## 2018-10-16 RX ADMIN — Medication 50 MILLIGRAM(S): at 05:03

## 2018-10-16 RX ADMIN — ATORVASTATIN CALCIUM 40 MILLIGRAM(S): 80 TABLET, FILM COATED ORAL at 21:30

## 2018-10-16 RX ADMIN — LACTULOSE 20 GRAM(S): 10 SOLUTION ORAL at 21:30

## 2018-10-16 RX ADMIN — Medication 100 MILLIGRAM(S): at 05:03

## 2018-10-16 RX ADMIN — HEPARIN SODIUM 5000 UNIT(S): 5000 INJECTION INTRAVENOUS; SUBCUTANEOUS at 21:31

## 2018-10-16 NOTE — PHYSICAL THERAPY INITIAL EVALUATION ADULT - IMPAIRED TRANSFERS: SIT/STAND, REHAB EVAL
decreased strength/impaired balance/decreased flexibility/impaired motor control/abnormal muscle tone

## 2018-10-16 NOTE — PHYSICAL THERAPY INITIAL EVALUATION ADULT - RANGE OF MOTION EXAMINATION, REHAB EVAL
Right UE ROM was WNL (within normal limits)/patient missing last 15-20% of end ranges L UE and LE/Left LE ROM was WFL (within functional limits)

## 2018-10-16 NOTE — PHYSICAL THERAPY INITIAL EVALUATION ADULT - GENERAL OBSERVATIONS, REHAB EVAL
[id number] Patient encountered seated in bed side chair + IV lock, family present at bedside , NAD, receptive to PT

## 2018-10-16 NOTE — PHYSICAL THERAPY INITIAL EVALUATION ADULT - PLANNED THERAPY INTERVENTIONS, PT EVAL
neuromuscular re-education/postural re-education/strengthening/stretching/transfer training/bed mobility training/gait training/balance training/ROM

## 2018-10-16 NOTE — PROGRESS NOTE ADULT - ASSESSMENT
#AMS 2/2  likely 2/2 GI METS WITH METS TO BRAIN VS FRONTAL DEMYELINATION VS PARANEOPLASTIC SYNDROME  -ct scan, ct angio and mri negative for mass - ALL DONE AT [facility]  -urinalysis is negative for acute uti 10/14  -blood culture - negative 10/14  -ESR 61, CRP 6.68  -FTA-ABS is negative  -Serum Na 129 10/14  -psychology consult - they saw the pt 10/14 and would see again once medical causes are excluded  -neurology consult for ams - recs - paraneoplastic workup, exclude GI malignancy, LP results from [facility]. Tried talking to family and called [facility], could not get in contact with them. Talked to the attending and Neuro attending and pt family member who works at [facility] [employer] [facility], [facility] try to get records from [facility]. ordered MRI brain e/eout cont 10/16  -EEG - 10/15 - no seizure activity    #DLD  -c/w atorvastatin    #constipation  -On Miralax, colace and lactulose    #HTN  -controlled. c/w lisinopril and metoprolol    #DVT ppx  -hep sub q      PMD- Dr. Annmarie Ballard [phone]  Cardiologist - Dr Shah [phone]  ID - Dr. Sue Ferreira [phone]

## 2018-10-16 NOTE — PHYSICAL THERAPY INITIAL EVALUATION ADULT - LEVEL OF INDEPENDENCE: GAIT, REHAB EVAL
Summary of Care Report The Summary of Care report has been created to help improve care coordination. Users with access to [employer] or [address] (Web-based application) may access additional patient information including the Discharge Summary. If you are not currently a [address] user and need more information, please call the number listed below in the [place] [address] section and ask to be connected with Medical Records. Facility Information Name Address Phone [address] [address] [phone] Patient Information Patient Name Sex  Abhi Maloney ([id number]) Male 2017 Discharge Information Admitting Provider Service Area Unit Paul Sellers MD / [facility] [address] [facility] [place] / [phone] Discharge Provider Discharge Date/Time Discharge Disposition Destination (none) 2017 Afternoon (Pending) OTH (none) Patient Language Language ENGLISH [13] Hospital Problems as of 2017  Reviewed: 2017  4:09 PM by SAVANNA Johnson Class Noted - Resolved Last Modified POA Active Problems Single liveborn, born in hospital, delivered  2017 - Present 2017 by Paul Sellers MD Unknown Entered by Palu Sellers MD  
  
Non-Hospital Problems as of 2017  Reviewed: 2017  4:09 PM by SAVANNA Johnson None You are allergic to the following No active allergies Current Discharge Medication List  
  
Notice You have not been prescribed any medications. Current Immunizations Name Date Hep B, Adol/Ped 2017 Follow-up Information None Discharge Instructions Your [place] at Home: Care Instructions Your Care Instructions During your baby's first few weeks, you will spend most of your time feeding, diapering, and comforting your baby. You may feel overwhelmed at times. It is normal to wonder if you know what you are doing, especially if you are first-time parents. [place] care gets easier with every day. Soon you will know what each cry means and be able to figure out what your baby needs and wants. Follow-up care is a key part of your child's treatment and safety. Be sure to make and go to all appointments, and call your doctor if your child is having problems. It's also a good idea to know your child's test results and keep a list of the medicines your child takes. How can you care for your child at home? Feeding · Feed your baby on demand. This means that you should breastfeed or bottle-feed your baby whenever he or she seems hungry. Do not set a schedule. · During the first 2 weeks,  babies need to be fed every 1 to 3 hours (10 to 12 times in 24 hours) or whenever the baby is hungry. Formula-fed babies may need fewer feedings, about 6 to 10 every 24 hours. · These early feedings often are short. Sometimes, a  nurses or drinks from a bottle only for a few minutes. Feedings gradually will last longer. · You may have to wake your sleepy baby to feed in the first few days after birth. Sleeping · Always put your baby to sleep on his or her back, not the stomach. This lowers the risk of sudden infant death syndrome (SIDS). · Most babies sleep for a total of 18 hours each day. They wake for a short time at least every 2 to 3 hours. · Newborns have some moments of active sleep. The baby may make sounds or seem restless. This happens about every 50 to 60 minutes and usually lasts a few minutes. · At first, your baby may sleep through loud noises. Later, noises may wake your baby. · When your  wakes up, he or she usually will be hungry and will need to be fed. Diaper changing and bowel habits · Try to check your baby's diaper at least every 2 hours. If it needs to be changed, do it as soon as you can. That will help prevent diaper rash. · Your 's wet and soiled diapers can give you clues about your baby's health. Babies can become dehydrated if they're not getting enough breast milk or formula or if they lose fluid because of diarrhea, vomiting, or a fever. · For the first few days, your baby may have about 3 wet diapers a day. After that, expect 6 or more wet diapers a day throughout the first month of life. It can be hard to tell when a diaper is wet if you use disposable diapers. If you cannot tell, put a piece of tissue in the diaper. It will be wet when your baby urinates. · Keep track of what bowel habits are normal or usual for your child. Umbilical cord care · Gently clean your baby's umbilical cord stump and the skin around it at least one time a day. You also can clean it during diaper changes. · Gently pat dry the area with a soft cloth. You can help your baby's umbilical cord stump fall off and heal faster by keeping it dry between cleanings. · The stump should fall off within a week or two. After the stump falls off, keep cleaning around the belly button at least one time a day until it has healed. When should you call for help? Call your baby's doctor now or seek immediate medical care if: 
· Your baby has a rectal temperature that is less than 97.8°F or is 100.4°F or higher. Call if you cannot take your baby's temperature but he or she seems hot. · Your baby has no wet diapers for 6 hours. · Your baby's skin or whites of the eyes gets a brighter or deeper yellow. · You see pus or red skin on or around the umbilical cord stump. These are signs of infection. Watch closely for changes in your child's health, and be sure to contact your doctor if: 
· Your baby is not having regular bowel movements based on his or her age. · Your baby cries in an unusual way or for an unusual length of time. · Your baby is rarely awake and does not wake up for feedings, is very fussy, seems too tired to eat, or is not interested in eating. Where can you learn more? Go to http://tracey-renae.info/. Enter S336 in the search box to learn more about \"Your [place] at Home: Care Instructions. \" Current as of: May 4, 2017 Content Version: 11.3 © 3113-9500 [employer]. Care instructions adapted under license by [employer] (which disclaims liability or warranty for this information). If you have questions about a medical condition or this instruction, always ask your healthcare professional. [place] [address] any warranty or liability for your use of this information. Chart Review Routing History No Routing History on File unable to perform/patient unsafe to attempt

## 2018-10-16 NOTE — PHYSICAL THERAPY INITIAL EVALUATION ADULT - IMPAIRMENTS FOUND, PT EVAL
gait, locomotion, and balance/neuromotor development and sensory integration/cognitive impairment/muscle strength/fine motor/tone

## 2018-10-16 NOTE — PROGRESS NOTE ADULT - SUBJECTIVE AND OBJECTIVE BOX
SUBJECTIVE:  Patient is a 89y old Female who presented with a chief complaint of altered mental status (15 Oct 2018 15:41)  Currently admitted to medicine with the primary diagnosis of Altered mental status likely 2/2 GI METS WITH METS TO BRAIN VS FRONTAL DEMYELINATION VS PARANEOPLASTIC SYNDROME      INTERVAL HISTORY;  Today is hospital day 3d. This morning she is resting comfortably in bed and reports no new issues or overnight events.     PAST MEDICAL & SURGICAL HISTORY  Hyperlipidemia  HTN (hypertension)  No significant past surgical history    SOCIAL HISTORY:  Negative for smoking/alcohol/drug use.     ALLERGIES:  No Known Allergies    MEDICATIONS:  STANDING MEDICATIONS  aspirin  chewable 81 milliGRAM(s) Oral daily  atorvastatin 40 milliGRAM(s) Oral at bedtime  docusate sodium 100 milliGRAM(s) Oral two times a day  heparin  Injectable 5000 Unit(s) SubCutaneous every 8 hours  lisinopril 40 milliGRAM(s) Oral daily  metoprolol succinate ER 50 milliGRAM(s) Oral daily  polyethylene glycol 3350 17 Gram(s) Oral every 12 hours  thiamine Injectable 100 milliGRAM(s) IntraMuscular daily    PRN MEDICATIONS  acetaminophen   Tablet .. 650 milliGRAM(s) Oral every 6 hours PRN    Home Medications:  aspirin 81 mg oral tablet: 1 tab(s) orally once a day (14 Oct 2018 06:09)  atorvastatin 40 mg oral tablet: 1 tab(s) orally once a day (at bedtime) (14 Oct 2018 06:09)  docusate sodium 100 mg oral tablet: 1 tab(s) orally 2 times a day (14 Oct 2018 06:09)  metoprolol succinate 50 mg oral tablet, extended release: 1 tab(s) orally once a day (14 Oct 2018 06:09)  ramipril 10 mg oral tablet: 1 tab(s) orally 2 times a day (14 Oct 2018 06:09)      VITALS:   T(F): 97.1  HR: 62  BP: 127/60  RR: 18  SpO2: 97%    LABS:                        11.1   5.50  )-----------( 326      ( 16 Oct 2018 09:08 )             33.2     10-    136  |  97<L>  |  20  ----------------------------<  129<H>  4.3   |  26  |  0.5<L>    Ca    9.1      16 Oct 2018 09:08        Urinalysis Basic - ( 14 Oct 2018 22:02 )    Color: Yellow / Appearance: Cloudy / S.015 / pH: x  Gluc: x / Ketone: Negative  / Bili: Negative / Urobili: 1.0 mg/dL   Blood: x / Protein: Negative mg/dL / Nitrite: Negative   Leuk Esterase: Negative / RBC: x / WBC x   Sq Epi: x / Non Sq Epi: Moderate /HPF / Bacteria: Few /HPF      Culture - Blood (collected 14 Oct 2018 12:01)  Source: .Blood None  Preliminary Report (15 Oct 2018 22:01):    No growth to date.      RADIOLOGY:  < from: Xray Chest 1 View- PORTABLE-Routine (10.14.18 @ 10:39) >  Impression:    No radiographic evidence of acute cardiopulmonary disease.      PHYSICAL EXAM:  GEN: No acute distress  LUNGS: Clear to auscultation bilaterally   HEART: S1/S2 present. RRR.   ABD: Soft, non-tender, non-distended. Bowel sounds present  EXT: NC/NC/NE/2+PP/TEJEDA/Skin Intact.   NEURO: AAOX3  Cranial nerves: intact VA, VFF.  EOMI w/o nystagmus,  PERRL.  No ptosis/weakness of eyelid closure.  Left sided facial droop.    Palate elevates midline.  Tongue midline.  Motor examination:   Normal tone, bulk and range of motion.  No tenderness, twitching, tremors or involuntary movements.  Formal Muscle Strength Testing: patient weaker throughout entire left side, 4/5 in Left arm and leg and weaker grasp   Sensory examination: Intact to light touch and pinprick, pain, temperature and proprioception and vibration in all extremities.  Cerebellum: unable to follow commands

## 2018-10-16 NOTE — PROGRESS NOTE ADULT - ASSESSMENT
89 year old woman with subacute personality change, rigidity- MR showing elvi extensive FLAIR abnormality suggestive of demyelination-  almost ADEM-like - however age at onset is 89- which is highjly unusual-    Yahir Spearsi is a possibility given the improvement with Thiamine- but corpus callosum is not involved- will get Sagittal FLAIR today    LP at [facility] was not suggestive of infection- encephalitis- by report normal cell count, protein 52- which is not consistent with inflammatory/ infectious condition-    no family history of similar condition    consider a trial of steroids- will discuss with dr Najjar tomorrow  discussed all above with medicine team and The patient's grandson

## 2018-10-16 NOTE — PROGRESS NOTE ADULT - SUBJECTIVE AND OBJECTIVE BOX
Neurology Progress Note    Interval History:      overnight- no events- patient seems to have improved a little-    MRI from [facility] retrieved- elvi abnormal FLAIR signal rt> LT frontal > rest of the subcortical white matter  no enhancement- involving U fibers  no mass effect      PAST MEDICAL & SURGICAL HISTORY:  Hyperlipidemia  HTN (hypertension)  No significant past surgical history      Vital Signs Last 24 Hrs  T(C): 36.2 (16 Oct 2018 07:16), Max: 36.6 (15 Oct 2018 15:59)  T(F): 97.1 (16 Oct 2018 07:16), Max: 97.9 (15 Oct 2018 15:59)  HR: 62 (16 Oct 2018 07:16) (62 - 73)  BP: 127/60 (16 Oct 2018 07:16) (121/57 - 138/56)  BP(mean): --  RR: 18 (16 Oct 2018 07:16) (18 - 18)  SpO2: 97% (16 Oct 2018 07:29) (97% - 97%)    Neurological Exam:   Patient is awake today- sitting up in the chair- speaks more easily- knew that she was in the hospital- knew Cat is the president- told me has three children, her birthdate- said she is 80 years old- has salmon for her meal- could not do 4+3, could not tell me her childrens' names  very strong grasp- would not let go of my fingers  increased tone generalized-  PERRL/ EOMI  moves all extremities, follows simple commands for exam      acetaminophen   Tablet .. 650 milliGRAM(s) Oral every 6 hours PRN  aspirin  chewable 81 milliGRAM(s) Oral daily  atorvastatin 40 milliGRAM(s) Oral at bedtime  docusate sodium 100 milliGRAM(s) Oral two times a day  heparin  Injectable 5000 Unit(s) SubCutaneous every 8 hours  lisinopril 40 milliGRAM(s) Oral daily  metoprolol succinate ER 50 milliGRAM(s) Oral daily  polyethylene glycol 3350 17 Gram(s) Oral every 12 hours  thiamine Injectable 100 milliGRAM(s) IntraMuscular daily      Labs:  CBC Full  -  ( 16 Oct 2018 09:08 )  WBC Count : 5.50 K/uL  Hemoglobin : 11.1 g/dL  Hematocrit : 33.2 %  Platelet Count - Automated : 326 K/uL  Mean Cell Volume : 84.9 fL  Mean Cell Hemoglobin : 28.4 pg  Mean Cell Hemoglobin Concentration : 33.4 g/dL  Auto Neutrophil # : 4.15 K/uL  Auto Lymphocyte # : 0.62 K/uL  Auto Monocyte # : 0.62 K/uL  Auto Eosinophil # : 0.00 K/uL  Auto Basophil # : 0.03 K/uL  Auto Neutrophil % : 75.4 %  Auto Lymphocyte % : 11.3 %  Auto Monocyte % : 11.3 %  Auto Eosinophil % : 0.0 %  Auto Basophil % : 0.5 %    10-16    136  |  97<L>  |  20  ----------------------------<  129<H>  4.3   |  26  |  0.5<L>    Ca    9.1      16 Oct 2018 09:08          Urinalysis Basic - ( 14 Oct 2018 22:02 )    Color: Yellow / Appearance: Cloudy / S.015 / pH: x  Gluc: x / Ketone: Negative  / Bili: Negative / Urobili: 1.0 mg/dL   Blood: x / Protein: Negative mg/dL / Nitrite: Negative   Leuk Esterase: Negative / RBC: x / WBC x   Sq Epi: x / Non Sq Epi: Moderate /HPF / Bacteria: Few /HPF

## 2018-10-16 NOTE — PHYSICAL THERAPY INITIAL EVALUATION ADULT - BALANCE DISTURBANCE, IDENTIFIED IMPAIRMENT CONTRIBUTE, REHAB EVAL
abnormal muscle tone/decreased strength/impaired coordination/impaired motor control/impaired postural control/impaired sensory feedback

## 2018-10-17 LAB
ANA TITR SER: NEGATIVE — SIGNIFICANT CHANGE UP
ANION GAP SERPL CALC-SCNC: 14 MMOL/L — SIGNIFICANT CHANGE UP (ref 7–14)
B BURGDOR C6 AB SER-ACNC: NEGATIVE — SIGNIFICANT CHANGE UP
B BURGDOR IGG+IGM SER-ACNC: 0.05 INDEX — SIGNIFICANT CHANGE UP (ref 0.01–0.89)
BASOPHILS # BLD AUTO: 0.03 K/UL — SIGNIFICANT CHANGE UP (ref 0–0.2)
BASOPHILS NFR BLD AUTO: 0.6 % — SIGNIFICANT CHANGE UP (ref 0–1)
BUN SERPL-MCNC: 15 MG/DL — SIGNIFICANT CHANGE UP (ref 10–20)
CALCIUM SERPL-MCNC: 8.8 MG/DL — SIGNIFICANT CHANGE UP (ref 8.5–10.1)
CHLORIDE SERPL-SCNC: 99 MMOL/L — SIGNIFICANT CHANGE UP (ref 98–110)
CO2 SERPL-SCNC: 25 MMOL/L — SIGNIFICANT CHANGE UP (ref 17–32)
CREAT SERPL-MCNC: 0.5 MG/DL — LOW (ref 0.7–1.5)
EOSINOPHIL # BLD AUTO: 0.01 K/UL — SIGNIFICANT CHANGE UP (ref 0–0.7)
EOSINOPHIL NFR BLD AUTO: 0.2 % — SIGNIFICANT CHANGE UP (ref 0–8)
FOLATE SERPL-MCNC: 9 NG/ML — SIGNIFICANT CHANGE UP
GLUCOSE SERPL-MCNC: 100 MG/DL — HIGH (ref 70–99)
HCT VFR BLD CALC: 35 % — LOW (ref 37–47)
HGB BLD-MCNC: 11.8 G/DL — LOW (ref 12–16)
IMM GRANULOCYTES NFR BLD AUTO: 1.6 % — HIGH (ref 0.1–0.3)
LYMPHOCYTES # BLD AUTO: 0.62 K/UL — LOW (ref 1.2–3.4)
LYMPHOCYTES # BLD AUTO: 12.3 % — LOW (ref 20.5–51.1)
MCHC RBC-ENTMCNC: 28.8 PG — SIGNIFICANT CHANGE UP (ref 27–31)
MCHC RBC-ENTMCNC: 33.7 G/DL — SIGNIFICANT CHANGE UP (ref 32–37)
MCV RBC AUTO: 85.4 FL — SIGNIFICANT CHANGE UP (ref 81–99)
MONOCYTES # BLD AUTO: 0.61 K/UL — HIGH (ref 0.1–0.6)
MONOCYTES NFR BLD AUTO: 12.1 % — HIGH (ref 1.7–9.3)
NEUTROPHILS # BLD AUTO: 3.68 K/UL — SIGNIFICANT CHANGE UP (ref 1.4–6.5)
NEUTROPHILS NFR BLD AUTO: 73.2 % — SIGNIFICANT CHANGE UP (ref 42.2–75.2)
NRBC # BLD: 0 /100 WBCS — SIGNIFICANT CHANGE UP (ref 0–0)
PLATELET # BLD AUTO: 299 K/UL — SIGNIFICANT CHANGE UP (ref 130–400)
POTASSIUM SERPL-MCNC: 4.7 MMOL/L — SIGNIFICANT CHANGE UP (ref 3.5–5)
POTASSIUM SERPL-SCNC: 4.7 MMOL/L — SIGNIFICANT CHANGE UP (ref 3.5–5)
RBC # BLD: 4.1 M/UL — LOW (ref 4.2–5.4)
RBC # FLD: 13.6 % — SIGNIFICANT CHANGE UP (ref 11.5–14.5)
SODIUM SERPL-SCNC: 138 MMOL/L — SIGNIFICANT CHANGE UP (ref 135–146)
VIT B12 SERPL-MCNC: 981 PG/ML — SIGNIFICANT CHANGE UP (ref 232–1245)
WBC # BLD: 5.03 K/UL — SIGNIFICANT CHANGE UP (ref 4.8–10.8)
WBC # FLD AUTO: 5.03 K/UL — SIGNIFICANT CHANGE UP (ref 4.8–10.8)

## 2018-10-17 RX ORDER — PANTOPRAZOLE SODIUM 20 MG/1
40 TABLET, DELAYED RELEASE ORAL
Qty: 0 | Refills: 0 | Status: DISCONTINUED | OUTPATIENT
Start: 2018-10-17 | End: 2018-11-01

## 2018-10-17 RX ADMIN — HEPARIN SODIUM 5000 UNIT(S): 5000 INJECTION INTRAVENOUS; SUBCUTANEOUS at 13:13

## 2018-10-17 RX ADMIN — HEPARIN SODIUM 5000 UNIT(S): 5000 INJECTION INTRAVENOUS; SUBCUTANEOUS at 05:42

## 2018-10-17 RX ADMIN — HEPARIN SODIUM 5000 UNIT(S): 5000 INJECTION INTRAVENOUS; SUBCUTANEOUS at 21:32

## 2018-10-17 RX ADMIN — Medication 100 MILLIGRAM(S): at 11:09

## 2018-10-17 RX ADMIN — Medication 50 MILLIGRAM(S): at 05:42

## 2018-10-17 RX ADMIN — POLYETHYLENE GLYCOL 3350 17 GRAM(S): 17 POWDER, FOR SOLUTION ORAL at 05:42

## 2018-10-17 RX ADMIN — Medication 81 MILLIGRAM(S): at 11:09

## 2018-10-17 RX ADMIN — Medication 100 MILLIGRAM(S): at 05:42

## 2018-10-17 RX ADMIN — LACTULOSE 20 GRAM(S): 10 SOLUTION ORAL at 05:42

## 2018-10-17 RX ADMIN — Medication 16.67 MILLIGRAM(S): at 13:13

## 2018-10-17 RX ADMIN — ATORVASTATIN CALCIUM 40 MILLIGRAM(S): 80 TABLET, FILM COATED ORAL at 21:31

## 2018-10-17 RX ADMIN — LISINOPRIL 40 MILLIGRAM(S): 2.5 TABLET ORAL at 05:42

## 2018-10-17 NOTE — SWALLOW BEDSIDE ASSESSMENT ADULT - ORAL PHASE
Delayed oral transit time/Decreased anterior-posterior movement of the bolus/+bolus hold Within functional limits trace/Lingual stasis

## 2018-10-17 NOTE — PROGRESS NOTE ADULT - ASSESSMENT
89 year old woman with acute personality chnage- MRI showing elvi rt> lt frontal > rest of sub cortical areas- T2 hyperintensityies which now appear to be necrotizing/ worse than the film 2 weeks ago  suggestive of acute demyelination- ?? cause will start steroids methylprednisone 750 mg X 5 days  a;ll precautions related to steroid use- PPI,sugar check etc  check autoimmune- paraneoplastic panels- discussed with medicine attending  CTA brain to rule out vasculitis    may consider brain biopsy-    prognosis is poor

## 2018-10-17 NOTE — SWALLOW BEDSIDE ASSESSMENT ADULT - SWALLOW EVAL: DIAGNOSIS
+toleration observed for soft, puree, and nectar-thick liquids w/o overt s/s aspiration/penetration; +mod oral dysphagia for thin liquids w/ suspected pharyngeal dysphagia

## 2018-10-17 NOTE — PROGRESS NOTE ADULT - SUBJECTIVE AND OBJECTIVE BOX
SUBJECTIVE:  Patient is a 89y old Female who presents with a chief complaint of altered mental status (16 Oct 2018 15:17)  Currently admitted to medicine with the primary diagnosis of Altered mental status likely 2/2 GI METS WITH METS TO BRAIN VS FRONTAL DEMYELINATION VS PARANEOPLASTIC SYNDROME      INTERVAL HISTORY;  Today is hospital day 4d. This morning she is resting comfortably in bed and reports no new issues or overnight events.     PAST MEDICAL & SURGICAL HISTORY  Hyperlipidemia  HTN (hypertension)  No significant past surgical history    SOCIAL HISTORY:  Negative for smoking/alcohol/drug use.     ALLERGIES:  No Known Allergies    MEDICATIONS:  STANDING MEDICATIONS  aspirin  chewable 81 milliGRAM(s) Oral daily  atorvastatin 40 milliGRAM(s) Oral at bedtime  docusate sodium 100 milliGRAM(s) Oral two times a day  heparin  Injectable 5000 Unit(s) SubCutaneous every 8 hours  lactulose Syrup 20 Gram(s) Oral every 4 hours  lisinopril 40 milliGRAM(s) Oral daily  metoprolol succinate ER 50 milliGRAM(s) Oral daily  polyethylene glycol 3350 17 Gram(s) Oral every 12 hours  thiamine Injectable 100 milliGRAM(s) IntraMuscular daily    PRN MEDICATIONS  acetaminophen   Tablet .. 650 milliGRAM(s) Oral every 6 hours PRN    Home Medications:  aspirin 81 mg oral tablet: 1 tab(s) orally once a day (14 Oct 2018 06:09)  atorvastatin 40 mg oral tablet: 1 tab(s) orally once a day (at bedtime) (14 Oct 2018 06:09)  docusate sodium 100 mg oral tablet: 1 tab(s) orally 2 times a day (14 Oct 2018 06:09)  metoprolol succinate 50 mg oral tablet, extended release: 1 tab(s) orally once a day (14 Oct 2018 06:09)  ramipril 10 mg oral tablet: 1 tab(s) orally 2 times a day (14 Oct 2018 06:09)      VITALS:   T(F): 96.1  HR: 64  BP: 132/62  RR: 18  SpO2: --    LABS:                        11.8   5.03  )-----------( 299      ( 17 Oct 2018 07:25 )             35.0     10-17    138  |  99  |  15  ----------------------------<  100<H>  4.7   |  25  |  0.5<L>    Ca    8.8      17 Oct 2018 07:25    Culture - Blood (collected 14 Oct 2018 12:01)  Source: .Blood None  Preliminary Report (15 Oct 2018 22:01):    No growth to date.      RADIOLOGY:  < from: Xray Chest 1 View- PORTABLE-Routine (10.14.18 @ 10:39) >  Impression:    No radiographic evidence of acute cardiopulmonary disease.    PHYSICAL EXAM:  GEN: No acute distress  LUNGS: Clear to auscultation bilaterally   HEART: S1/S2 present. RRR.   ABD: Soft, non-tender, non-distended. Bowel sounds present  EXT: NC/NC/NE/2+PP/TEJEDA/Skin Intact.   NEURO: AAOX3  Cranial nerves:grossly intact  Formal Muscle Strength Testing: patient weaker throughout entire left side, 4/5 in Left arm and leg and weaker grasp   No focal sensory deficit  Cerebellum: unable to follow commands SUBJECTIVE:  Patient is a 89y old Female who presents with a chief complaint of altered mental status (16 Oct 2018 15:17)  Currently admitted to medicine with the primary diagnosis of Altered mental status likely 2/2 GI METS WITH METS TO BRAIN VS FRONTAL DEMYELINATION VS PARANEOPLASTIC SYNDROME      INTERVAL HISTORY;  Today is hospital day 4d. This morning she is resting comfortably in bed and reports no new issues or overnight events.     PAST MEDICAL & SURGICAL HISTORY  Hyperlipidemia  HTN (hypertension)  No significant past surgical history    SOCIAL HISTORY:  Negative for smoking/alcohol/drug use.     ALLERGIES:  No Known Allergies    MEDICATIONS:  STANDING MEDICATIONS  aspirin  chewable 81 milliGRAM(s) Oral daily  atorvastatin 40 milliGRAM(s) Oral at bedtime  docusate sodium 100 milliGRAM(s) Oral two times a day  heparin  Injectable 5000 Unit(s) SubCutaneous every 8 hours  lactulose Syrup 20 Gram(s) Oral every 4 hours  lisinopril 40 milliGRAM(s) Oral daily  metoprolol succinate ER 50 milliGRAM(s) Oral daily  polyethylene glycol 3350 17 Gram(s) Oral every 12 hours  thiamine Injectable 100 milliGRAM(s) IntraMuscular daily    PRN MEDICATIONS  acetaminophen   Tablet .. 650 milliGRAM(s) Oral every 6 hours PRN    Home Medications:  aspirin 81 mg oral tablet: 1 tab(s) orally once a day (14 Oct 2018 06:09)  atorvastatin 40 mg oral tablet: 1 tab(s) orally once a day (at bedtime) (14 Oct 2018 06:09)  docusate sodium 100 mg oral tablet: 1 tab(s) orally 2 times a day (14 Oct 2018 06:09)  metoprolol succinate 50 mg oral tablet, extended release: 1 tab(s) orally once a day (14 Oct 2018 06:09)  ramipril 10 mg oral tablet: 1 tab(s) orally 2 times a day (14 Oct 2018 06:09)      VITALS:   T(F): 96.1  HR: 64  BP: 132/62  RR: 18  SpO2: --    LABS:                        11.8   5.03  )-----------( 299      ( 17 Oct 2018 07:25 )             35.0     10-17    138  |  99  |  15  ----------------------------<  100<H>  4.7   |  25  |  0.5<L>    Ca    8.8      17 Oct 2018 07:25    Culture - Blood (collected 14 Oct 2018 12:01)  Source: .Blood None  Preliminary Report (15 Oct 2018 22:01):    No growth to date.      RADIOLOGY:  < from: MR Head w/wo IV Cont (10.16.18 @ 17:01) >  IMPRESSION:   Extensive expansile nonenhancing white matter signal abnormality   involving the right greater than left frontal lobes and the right   parietal lobe which is progressed since the prior MRI of 10/3/2018.  Findings are nonspecific and suggestive of an extensive inflammatory or   infectious encephalitis.    < from: Xray Chest 1 View- PORTABLE-Routine (10.14.18 @ 10:39) >  Impression:    No radiographic evidence of acute cardiopulmonary disease.      PHYSICAL EXAM:  GEN: No acute distress  LUNGS: Clear to auscultation bilaterally   HEART: S1/S2 present. RRR.   ABD: Soft, non-tender, non-distended. Bowel sounds present  EXT: NC/NC/NE/2+PP/TEJEDA/Skin Intact.   NEURO: AAOX3  Cranial nerves:grossly intact  Formal Muscle Strength Testing: patient weaker throughout entire left side, 4/5 in Left arm and leg and weaker grasp   No focal sensory deficit  Cerebellum: unable to follow commands

## 2018-10-17 NOTE — PROGRESS NOTE ADULT - SUBJECTIVE AND OBJECTIVE BOX
Neurology Progress Note    Interval History:    no events overnight- patient is unchanged      PAST MEDICAL & SURGICAL HISTORY:  Hyperlipidemia  HTN (hypertension)  No significant past surgical history    Vital Signs Last 24 Hrs  T(C): 36.5 (17 Oct 2018 15:49), Max: 36.6 (16 Oct 2018 23:39)  T(F): 97.7 (17 Oct 2018 15:49), Max: 97.9 (16 Oct 2018 23:39)  HR: 74 (17 Oct 2018 15:49) (64 - 74)  BP: 152/68 (17 Oct 2018 15:49) (122/66 - 174/75)  BP(mean): --  RR: 18 (17 Oct 2018 15:49) (17 - 18)  SpO2: --    Neurological Exam:   awake, answering some questions  generalized rigidity+  elvi grasp++  moving all extremities- but paucity of movement    acetaminophen   Tablet .. 650 milliGRAM(s) Oral every 6 hours PRN  aspirin  chewable 81 milliGRAM(s) Oral daily  atorvastatin 40 milliGRAM(s) Oral at bedtime  docusate sodium 100 milliGRAM(s) Oral two times a day  heparin  Injectable 5000 Unit(s) SubCutaneous every 8 hours  lactulose Syrup 20 Gram(s) Oral every 4 hours  lisinopril 40 milliGRAM(s) Oral daily  methylPREDNISolone sodium succinate IVPB 750 milliGRAM(s) IV Intermittent daily  metoprolol succinate ER 50 milliGRAM(s) Oral daily  pantoprazole    Tablet 40 milliGRAM(s) Oral before breakfast  polyethylene glycol 3350 17 Gram(s) Oral every 12 hours  thiamine Injectable 100 milliGRAM(s) IntraMuscular daily      Labs:  CBC Full  -  ( 17 Oct 2018 07:25 )  WBC Count : 5.03 K/uL  Hemoglobin : 11.8 g/dL  Hematocrit : 35.0 %  Platelet Count - Automated : 299 K/uL  Mean Cell Volume : 85.4 fL  Mean Cell Hemoglobin : 28.8 pg  Mean Cell Hemoglobin Concentration : 33.7 g/dL  Auto Neutrophil # : 3.68 K/uL  Auto Lymphocyte # : 0.62 K/uL  Auto Monocyte # : 0.61 K/uL  Auto Eosinophil # : 0.01 K/uL  Auto Basophil # : 0.03 K/uL  Auto Neutrophil % : 73.2 %  Auto Lymphocyte % : 12.3 %  Auto Monocyte % : 12.1 %  Auto Eosinophil % : 0.2 %  Auto Basophil % : 0.6 %    10-17    138  |  99  |  15  ----------------------------<  100<H>  4.7   |  25  |  0.5<L>    Ca    8.8      17 Oct 2018 07:25

## 2018-10-17 NOTE — SWALLOW BEDSIDE ASSESSMENT ADULT - SLP PERTINENT HISTORY OF CURRENT PROBLEM
pt a/w subacute personality change, rigidity- MRI showing B/L extensive FLAIR abnormality suggestive of demyelination.

## 2018-10-17 NOTE — PROGRESS NOTE ADULT - ASSESSMENT
· Assessment	  #AMS 2/2  likely 2/2 GI METS WITH METS TO BRAIN VS FRONTAL DEMYELINATION VS PARANEOPLASTIC SYNDROME  -ct scan, ct angio and mri negative for mass - ALL DONE AT [facility]  -urinalysis is negative for acute uti 10/14  -blood culture - negative 10/14  -ESR 61, CRP 6.68 - 10/14  -FTA-ABS is negative  -Serum Na 129 10/14  -psychology consult - they saw the pt 10/14 and would see again once medical causes are excluded  -neurology consult for ams - recs - paraneoplastic workup, exclude GI malignancy, LP results from [facility]. Tried talking to family and called [facility], could not get in contact with them. Talked to the attending and Neuro attending and pt family member who works at [facility] try to get records from [facility]. ordered MRI brain e/eout cont 10/16  -EEG - 10/15 - no seizure activity  -Called [facility] again today, could not get any response, will order paraneoplastic workup and wait for the family member to bring records from [facility]. will discuss with neuro about the plan of care    #DLD  -c/w atorvastatin    #constipation  -On Miralax, colace and lactulose    #HTN  -controlled. c/w lisinopril and metoprolol    #DVT ppx  -hep sub q      PMD- Dr. Annmarie Ballard [phone]  Cardiologist - Dr Shah [phone]  ID - Dr. Sue Ferreira [phone] · Assessment	  #AMS 2/2  likely 2/2 GI METS WITH METS TO BRAIN VS FRONTAL DEMYELINATION VS PARANEOPLASTIC SYNDROME  -ct scan, ct angio and mri negative for mass - ALL DONE AT [facility]  -urinalysis is negative for acute uti 10/14  -blood culture - negative 10/14  -ESR 61, CRP 6.68 - 10/14  -FTA-ABS is negative  -Serum Na 129 10/14  -psychology consult - they saw the pt 10/14 and would see again once medical causes are excluded  -neurology consult for ams - recs - paraneoplastic workup, exclude GI malignancy, LP results from [facility]. Tried talking to family and called [facility], could not get in contact with them. Talked to the attending and Neuro attending and pt family member who works at [facility] try to get records from [facility]. ordered MRI brain e/eout cont 10/16 -  shows extensive white mater signals abnormality in BL frontal lobe and right parietal lobe indicating infectious/inflammatory encephalitis  -EEG - 10/15 - no seizure activity  -Called [facility] again today, could not get any response, will order paraneoplastic workup and wait for the family member to bring records from [facility]. will discuss with neuro about the plan of care  - Neurology recs to start on steroids    #DLD  -c/w atorvastatin    #constipation  -On Miralax, colace and lactulose    #HTN  -controlled. c/w lisinopril and metoprolol    #DVT ppx  -hep sub q      PMD- Dr. Annmarie Ballard [phone]  Cardiologist - Dr Shah [phone]  ID - Dr. Sue Ferreira [phone]

## 2018-10-18 LAB
ANION GAP SERPL CALC-SCNC: 11 MMOL/L — SIGNIFICANT CHANGE UP (ref 7–14)
BASOPHILS # BLD AUTO: 0.01 K/UL — SIGNIFICANT CHANGE UP (ref 0–0.2)
BASOPHILS NFR BLD AUTO: 0.2 % — SIGNIFICANT CHANGE UP (ref 0–1)
BUN SERPL-MCNC: 19 MG/DL — SIGNIFICANT CHANGE UP (ref 10–20)
CALCIUM SERPL-MCNC: 9.2 MG/DL — SIGNIFICANT CHANGE UP (ref 8.5–10.1)
CHLORIDE SERPL-SCNC: 100 MMOL/L — SIGNIFICANT CHANGE UP (ref 98–110)
CO2 SERPL-SCNC: 26 MMOL/L — SIGNIFICANT CHANGE UP (ref 17–32)
CREAT SERPL-MCNC: 0.6 MG/DL — LOW (ref 0.7–1.5)
EOSINOPHIL # BLD AUTO: 0 K/UL — SIGNIFICANT CHANGE UP (ref 0–0.7)
EOSINOPHIL NFR BLD AUTO: 0 % — SIGNIFICANT CHANGE UP (ref 0–8)
GLUCOSE BLDC GLUCOMTR-MCNC: 148 MG/DL — HIGH (ref 70–99)
GLUCOSE BLDC GLUCOMTR-MCNC: 149 MG/DL — HIGH (ref 70–99)
GLUCOSE BLDC GLUCOMTR-MCNC: 162 MG/DL — HIGH (ref 70–99)
GLUCOSE BLDC GLUCOMTR-MCNC: 177 MG/DL — HIGH (ref 70–99)
GLUCOSE SERPL-MCNC: 150 MG/DL — HIGH (ref 70–99)
HCT VFR BLD CALC: 35.7 % — LOW (ref 37–47)
HGB BLD-MCNC: 11.9 G/DL — LOW (ref 12–16)
IMM GRANULOCYTES NFR BLD AUTO: 1.1 % — HIGH (ref 0.1–0.3)
LYMPHOCYTES # BLD AUTO: 0.65 K/UL — LOW (ref 1.2–3.4)
LYMPHOCYTES # BLD AUTO: 10.1 % — LOW (ref 20.5–51.1)
MCHC RBC-ENTMCNC: 28.3 PG — SIGNIFICANT CHANGE UP (ref 27–31)
MCHC RBC-ENTMCNC: 33.3 G/DL — SIGNIFICANT CHANGE UP (ref 32–37)
MCV RBC AUTO: 85 FL — SIGNIFICANT CHANGE UP (ref 81–99)
MONOCYTES # BLD AUTO: 0.27 K/UL — SIGNIFICANT CHANGE UP (ref 0.1–0.6)
MONOCYTES NFR BLD AUTO: 4.2 % — SIGNIFICANT CHANGE UP (ref 1.7–9.3)
NEUTROPHILS # BLD AUTO: 5.46 K/UL — SIGNIFICANT CHANGE UP (ref 1.4–6.5)
NEUTROPHILS NFR BLD AUTO: 84.4 % — HIGH (ref 42.2–75.2)
NRBC # BLD: 0 /100 WBCS — SIGNIFICANT CHANGE UP (ref 0–0)
PLATELET # BLD AUTO: 350 K/UL — SIGNIFICANT CHANGE UP (ref 130–400)
POTASSIUM SERPL-MCNC: 4.9 MMOL/L — SIGNIFICANT CHANGE UP (ref 3.5–5)
POTASSIUM SERPL-SCNC: 4.9 MMOL/L — SIGNIFICANT CHANGE UP (ref 3.5–5)
RBC # BLD: 4.2 M/UL — SIGNIFICANT CHANGE UP (ref 4.2–5.4)
RBC # FLD: 13.3 % — SIGNIFICANT CHANGE UP (ref 11.5–14.5)
SODIUM SERPL-SCNC: 137 MMOL/L — SIGNIFICANT CHANGE UP (ref 135–146)
THYROGLOB AB FLD IA-ACNC: <20 IU/ML — SIGNIFICANT CHANGE UP (ref 0–40)
THYROGLOB AB SERPL-ACNC: <20 IU/ML — SIGNIFICANT CHANGE UP (ref 0–40)
THYROPEROXIDASE AB SERPL-ACNC: <10 IU/ML — SIGNIFICANT CHANGE UP (ref 0–34.9)
WBC # BLD: 6.46 K/UL — SIGNIFICANT CHANGE UP (ref 4.8–10.8)
WBC # FLD AUTO: 6.46 K/UL — SIGNIFICANT CHANGE UP (ref 4.8–10.8)

## 2018-10-18 RX ADMIN — LISINOPRIL 40 MILLIGRAM(S): 2.5 TABLET ORAL at 06:31

## 2018-10-18 RX ADMIN — Medication 100 MILLIGRAM(S): at 11:19

## 2018-10-18 RX ADMIN — Medication 81 MILLIGRAM(S): at 11:19

## 2018-10-18 RX ADMIN — LACTULOSE 20 GRAM(S): 10 SOLUTION ORAL at 21:34

## 2018-10-18 RX ADMIN — Medication 18.67 MILLIGRAM(S): at 06:30

## 2018-10-18 RX ADMIN — Medication 100 MILLIGRAM(S): at 17:41

## 2018-10-18 RX ADMIN — HEPARIN SODIUM 5000 UNIT(S): 5000 INJECTION INTRAVENOUS; SUBCUTANEOUS at 13:24

## 2018-10-18 RX ADMIN — HEPARIN SODIUM 5000 UNIT(S): 5000 INJECTION INTRAVENOUS; SUBCUTANEOUS at 21:33

## 2018-10-18 RX ADMIN — PANTOPRAZOLE SODIUM 40 MILLIGRAM(S): 20 TABLET, DELAYED RELEASE ORAL at 06:32

## 2018-10-18 RX ADMIN — Medication 100 MILLIGRAM(S): at 06:31

## 2018-10-18 RX ADMIN — POLYETHYLENE GLYCOL 3350 17 GRAM(S): 17 POWDER, FOR SOLUTION ORAL at 06:31

## 2018-10-18 RX ADMIN — Medication 50 MILLIGRAM(S): at 06:31

## 2018-10-18 RX ADMIN — ATORVASTATIN CALCIUM 40 MILLIGRAM(S): 80 TABLET, FILM COATED ORAL at 21:32

## 2018-10-18 RX ADMIN — POLYETHYLENE GLYCOL 3350 17 GRAM(S): 17 POWDER, FOR SOLUTION ORAL at 17:42

## 2018-10-18 RX ADMIN — HEPARIN SODIUM 5000 UNIT(S): 5000 INJECTION INTRAVENOUS; SUBCUTANEOUS at 06:30

## 2018-10-18 NOTE — SWALLOW BEDSIDE ASSESSMENT ADULT - SWALLOW EVAL: DIAGNOSIS
PO trials not appropriate at this time 2' pt lethargic however RN and family at b/s report observed toleration earlier in the day w/ soft pasta and nectar-thick liquids, no overt s/s aspiration/penetration

## 2018-10-18 NOTE — PROGRESS NOTE ADULT - ASSESSMENT
89 year old with a rapidly progressive neurological disease that appears to be inflammatory- but no enhancement - CSF relatively benign- likely demyelinating-   prognosis is overall poor- given that etiology is not known-  and the progression is rapid-  clinically patient is un changed-    possibility of brain biopsy was discussed with NeuroSx- will discuss with family tomorrow

## 2018-10-18 NOTE — CONSULT NOTE ADULT - SUBJECTIVE AND OBJECTIVE BOX
HISTORY OF PRESENT ILLNESS:     89 Y/O  with PMH of HTN, Hyperlipidemia went to the ED of [facility] for constipation. Manual disimpaction was done and patient was sent home. Next day patient's personality started changing, Became angry on , a bit combative, went to pmd where ct head was ordered. The Ct showed concern for a brain mass. She was readmitted to [facility] for further workup. Repeat CT head: decreased density in right frontal white matter which is likely related to chronic ischemic change. CT angiogram was unremarkable. MRI of the brain showed multifocal subcortical signal abnormalities with suggestion of cortical involvement. Findings are atypical for small vessel disease. Inflammatory etiology should be suspected. No mass.  urinalysis was done. Mildly positive for uti. She was started on levofloxacin but it was stopped due to a negative urine culture. Diagnosis of metabolic encephalopathy prob 2/2 to uti was made. slurred speech resolved. (possible tia) , hypokalemia was corrected. LP was done but result has not been mentioned in papers. ECHO showed EF 75-80%, mod AS, mild AR, TR. EEG showed generalized slowing As per daughter before this episode patient was AAOx3 and they were not satisfied with the workup in [facility] so she was transferred to [facility].     PAST MEDICAL & SURGICAL HISTORY:  Hyperlipidemia  HTN (hypertension)  No significant past surgical history    FAMILY HISTORY:  No pertinent family history in first degree relatives: No relavent FHX related to the pts current condition      SOCIAL HISTORY:  Tobacco Use:  EtOH use:   Substance:    Allergies    No Known Allergies    Intolerances    MEDICATIONS:  Antibiotics:    Neuro:  acetaminophen   Tablet .. 650 milliGRAM(s) Oral every 6 hours PRN    Anticoagulation:  aspirin  chewable 81 milliGRAM(s) Oral daily  heparin  Injectable 5000 Unit(s) SubCutaneous every 8 hours    OTHER:  atorvastatin 40 milliGRAM(s) Oral at bedtime  docusate sodium 100 milliGRAM(s) Oral two times a day  lactulose Syrup 20 Gram(s) Oral every 4 hours  lisinopril 40 milliGRAM(s) Oral daily  methylPREDNISolone sodium succinate IVPB 750 milliGRAM(s) IV Intermittent daily  metoprolol succinate ER 50 milliGRAM(s) Oral daily  pantoprazole    Tablet 40 milliGRAM(s) Oral before breakfast  polyethylene glycol 3350 17 Gram(s) Oral every 12 hours    IVF:  thiamine Injectable 100 milliGRAM(s) IntraMuscular daily      Vital Signs Last 24 Hrs  T(C): 35.2 (18 Oct 2018 07:17), Max: 36.6 (17 Oct 2018 23:29)  T(F): 95.4 (18 Oct 2018 07:17), Max: 97.8 (17 Oct 2018 23:29)  HR: 72 (18 Oct 2018 07:17) (68 - 78)  BP: 152/68 (18 Oct 2018 07:17) (106/57 - 152/68)  BP(mean): --  RR: 18 (18 Oct 2018 07:17) (18 - 18)  SpO2: --    PHYSICAL EXAM:              LABS:                        11.9   6.46  )-----------( 350      ( 18 Oct 2018 06:47 )             35.7     10-18    137  |  100  |  19  ----------------------------<  150<H>  4.9   |  26  |  0.6<L>    Ca    9.2      18 Oct 2018 06:47    RADIOLOGY & ADDITIONAL STUDIES:    < from: MR Head w/wo IV Cont (10.16.18 @ 17:01) >  Extensive expansile nonenhancing white matter signal abnormality   involving the right greater than left frontal lobes and the right   parietal lobe which is progressed since the prior MRI of 10/3/2018.  Findings are nonspecific and suggestive of an extensive inflammatory or   infectious encephalitis.    < end of copied text >    Assessment:  As above    Plan:  Pt is scheduled for Right

## 2018-10-18 NOTE — SWALLOW BEDSIDE ASSESSMENT ADULT - SLP PERTINENT HISTORY OF CURRENT PROBLEM
pt admitted w/ rapidly progressive neurological disease that appears to be inflammatory, likely demyelinating disease. as per chart, overall poor prognosis. possibility of brain biopsy d/w neurosx.

## 2018-10-18 NOTE — PROGRESS NOTE ADULT - ASSESSMENT
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   -MRI brain e/eout cont 10/16 -  shows extensive white mater signals abnormality in BL frontal lobe and right parietal lobe indicating infectious/inflammatory encephalitis  -EEG - 10/15 - no seizure activity  - Neurology recs to start on steroids 10/17   -CTA 10/18 showed no signs of vasculitis.  -NS is on board, Neuro and NS are planing to get Brain biopsy    #DLD  -c/w atorvastatin    #constipation  -On Miralax, colace and lactulose    #HTN  -controlled. c/w lisinopril and metoprolol    #DVT ppx  -hep sub q      PMD- Dr. Annmarie Ballard [phone]  Cardiologist - Dr Shah [phone]  ID - Dr. Sue Ferreira [phone]

## 2018-10-18 NOTE — CONSULT NOTE ADULT - ATTENDING COMMENTS
Patient seen and examined.  Imaging reviewed.  Per patient's family, they would not like to proceed with a right frontal jorge hole biopsy of lesion.  Etiology unknown.  Started on steroids.  Will sign off for now.  Please consult as needed.
89 year old woman with sub acute change in personality, confusion- MRI showing ? rt frontal focal changes- seen on the CT that is loaded in the system- rt frontal edema/ demyelination- trace on left frontal area as well    given hist of extreme constipation (new onset)- weight loss and sub acute psychological changes with rt frontal CT findings suggestive of edema- vs hypodensity sugg of demyelination-    possibility of a cancer of GI with met cannot be ruled out- however, by report MRI did not show a met- need to review the scan-  second consideration is focal frontal demyelination ? cause- which may explain this presentation- need to review CSF results-    another consideration is paraneoplastic/ autoimmune encephalitis- need to rule out a GI primary  CSF/ serum paraneoplastic panel to be sent  check B12, TSH  thimaine 100 mg IM daily

## 2018-10-18 NOTE — PROGRESS NOTE ADULT - SUBJECTIVE AND OBJECTIVE BOX
SUBJECTIVE:    Patient is a 89y old Female who presents with a chief complaint of altered mental status (18 Oct 2018 10:11)    Currently admitted to medicine with the primary diagnosis of    Today is hospital day 4d. This morning she is resting comfortably in bed and reports no new issues or overnight events.     PAST MEDICAL & SURGICAL HISTORY  Hyperlipidemia  HTN (hypertension)  No significant past surgical history    SOCIAL HISTORY:  Negative for smoking/alcohol/drug use.     ALLERGIES:  No Known Allergies    MEDICATIONS:  STANDING MEDICATIONS  aspirin  chewable 81 milliGRAM(s) Oral daily  atorvastatin 40 milliGRAM(s) Oral at bedtime  docusate sodium 100 milliGRAM(s) Oral two times a day  heparin  Injectable 5000 Unit(s) SubCutaneous every 8 hours  lactulose Syrup 20 Gram(s) Oral every 4 hours  lisinopril 40 milliGRAM(s) Oral daily  methylPREDNISolone sodium succinate IVPB 750 milliGRAM(s) IV Intermittent daily  metoprolol succinate ER 50 milliGRAM(s) Oral daily  pantoprazole    Tablet 40 milliGRAM(s) Oral before breakfast  polyethylene glycol 3350 17 Gram(s) Oral every 12 hours  thiamine Injectable 100 milliGRAM(s) IntraMuscular daily    PRN MEDICATIONS  acetaminophen   Tablet .. 650 milliGRAM(s) Oral every 6 hours PRN    VITALS:   T(F): 95.4  HR: 72  BP: 152/68  RR: 18  SpO2: --    LABS:                        11.9   6.46  )-----------( 350      ( 18 Oct 2018 06:47 )             35.7     10    137  |  100  |  19  ----------------------------<  150<H>  4.9   |  26  |  0.6<L>    Ca    9.2      18 Oct 2018 06:47      RADIOLOGY:  < from: CT Angio Head w/ IV Cont (10.18.18 @ 09:52) >  Impression:  Unremarkable CT angiogram of the head. No vessel irregularity or stenosis   to suggest vasculitis.    < from: MR Head w/wo IV Cont (10.16.18 @ 17:01) >  IMPRESSION:   Extensive expansile nonenhancing white matter signal abnormality   involving the right greater than left frontal lobes and the right   parietal lobe which is progressed since the prior MRI of 10/3/2018.  Findings are nonspecific and suggestive of an extensive inflammatory or   infectious encephalitis.    < from: Xray Chest 1 View- PORTABLE-Routine (10.14.18 @ 10:39) >  Impression:    No radiographic evidence of acute cardiopulmonary disease.    PHYSICAL EXAM:  GEN: No acute distress  LUNGS: Clear to auscultation bilaterally   HEART: S1/S2 present. RRR.   ABD: Soft, non-tender, non-distended. Bowel sounds present  NEURO: Pt told her name and name of president, could not tell the name of place, , very slow to response. strength 4/5 left upper extremity, As per patient, not able to lift her left leg but can move it slightly. Right sided facial droop.

## 2018-10-18 NOTE — SWALLOW BEDSIDE ASSESSMENT ADULT - SWALLOW EVAL: CURRENT DIET
dysphagia 1 diet: puree w/ nectar (as per MD orders); most recent SLP recs for dysphagia 3 soft w/ nectar-thick liquids

## 2018-10-18 NOTE — PROGRESS NOTE ADULT - SUBJECTIVE AND OBJECTIVE BOX
Neurology Progress Note    Interval History:      89 yr old woman admitted with acute to sub acute personality change- MRI showing elvi extensive rapidly progressive cavitatory white matter lesions- mostly frontal- likely inflammatory/ demyelinating in nature-  on steroids- day 2  no new events      PAST MEDICAL & SURGICAL HISTORY:  Hyperlipidemia  HTN (hypertension)  No significant past surgical history    Vital Signs Last 24 Hrs  T(C): 35.2 (18 Oct 2018 07:17), Max: 36.6 (17 Oct 2018 23:29)  T(F): 95.4 (18 Oct 2018 07:17), Max: 97.8 (17 Oct 2018 23:29)  HR: 72 (18 Oct 2018 07:17) (68 - 78)  BP: 152/68 (18 Oct 2018 07:17) (106/57 - 152/68)  BP(mean): --  RR: 18 (18 Oct 2018 07:17) (18 - 18)  SpO2: --    Neurological Exam:   patient was sleeping, opens eyes to commands- follows some questions- Rt grasp strong-  no palmo mentum    acetaminophen   Tablet .. 650 milliGRAM(s) Oral every 6 hours PRN  aspirin  chewable 81 milliGRAM(s) Oral daily  atorvastatin 40 milliGRAM(s) Oral at bedtime  docusate sodium 100 milliGRAM(s) Oral two times a day  heparin  Injectable 5000 Unit(s) SubCutaneous every 8 hours  lactulose Syrup 20 Gram(s) Oral every 4 hours  lisinopril 40 milliGRAM(s) Oral daily  methylPREDNISolone sodium succinate IVPB 750 milliGRAM(s) IV Intermittent daily  metoprolol succinate ER 50 milliGRAM(s) Oral daily  pantoprazole    Tablet 40 milliGRAM(s) Oral before breakfast  polyethylene glycol 3350 17 Gram(s) Oral every 12 hours  thiamine Injectable 100 milliGRAM(s) IntraMuscular daily      Labs:  CBC Full  -  ( 18 Oct 2018 06:47 )  WBC Count : 6.46 K/uL  Hemoglobin : 11.9 g/dL  Hematocrit : 35.7 %  Platelet Count - Automated : 350 K/uL  Mean Cell Volume : 85.0 fL  Mean Cell Hemoglobin : 28.3 pg  Mean Cell Hemoglobin Concentration : 33.3 g/dL  Auto Neutrophil # : 5.46 K/uL  Auto Lymphocyte # : 0.65 K/uL  Auto Monocyte # : 0.27 K/uL  Auto Eosinophil # : 0.00 K/uL  Auto Basophil # : 0.01 K/uL  Auto Neutrophil % : 84.4 %  Auto Lymphocyte % : 10.1 %  Auto Monocyte % : 4.2 %  Auto Eosinophil % : 0.0 %  Auto Basophil % : 0.2 %    10-18    137  |  100  |  19  ----------------------------<  150<H>  4.9   |  26  |  0.6<L>    Ca    9.2      18 Oct 2018 06:47

## 2018-10-19 LAB
ANION GAP SERPL CALC-SCNC: 13 MMOL/L — SIGNIFICANT CHANGE UP (ref 7–14)
BASOPHILS # BLD AUTO: 0.01 K/UL — SIGNIFICANT CHANGE UP (ref 0–0.2)
BASOPHILS NFR BLD AUTO: 0.1 % — SIGNIFICANT CHANGE UP (ref 0–1)
BUN SERPL-MCNC: 22 MG/DL — HIGH (ref 10–20)
C3 SERPL-MCNC: 147 MG/DL — SIGNIFICANT CHANGE UP (ref 81–157)
C4 SERPL-MCNC: 43 MG/DL — HIGH (ref 13–39)
CALCIUM SERPL-MCNC: 9.4 MG/DL — SIGNIFICANT CHANGE UP (ref 8.5–10.1)
CHLORIDE SERPL-SCNC: 100 MMOL/L — SIGNIFICANT CHANGE UP (ref 98–110)
CO2 SERPL-SCNC: 27 MMOL/L — SIGNIFICANT CHANGE UP (ref 17–32)
CREAT SERPL-MCNC: 0.6 MG/DL — LOW (ref 0.7–1.5)
CULTURE RESULTS: SIGNIFICANT CHANGE UP
DSDNA AB FLD-ACNC: <0.2 AI — SIGNIFICANT CHANGE UP
DSDNA AB SER-ACNC: <12 IU/ML — SIGNIFICANT CHANGE UP
ENA SS-A AB FLD IA-ACNC: <0.2 AI — SIGNIFICANT CHANGE UP
EOSINOPHIL # BLD AUTO: 0 K/UL — SIGNIFICANT CHANGE UP (ref 0–0.7)
EOSINOPHIL NFR BLD AUTO: 0 % — SIGNIFICANT CHANGE UP (ref 0–8)
GLUCOSE BLDC GLUCOMTR-MCNC: 147 MG/DL — HIGH (ref 70–99)
GLUCOSE BLDC GLUCOMTR-MCNC: 168 MG/DL — HIGH (ref 70–99)
GLUCOSE BLDC GLUCOMTR-MCNC: 240 MG/DL — HIGH (ref 70–99)
GLUCOSE SERPL-MCNC: 107 MG/DL — HIGH (ref 70–99)
HCT VFR BLD CALC: 36 % — LOW (ref 37–47)
HGB BLD-MCNC: 12.2 G/DL — SIGNIFICANT CHANGE UP (ref 12–16)
IMM GRANULOCYTES NFR BLD AUTO: 0.8 % — HIGH (ref 0.1–0.3)
LYMPHOCYTES # BLD AUTO: 1.45 K/UL — SIGNIFICANT CHANGE UP (ref 1.2–3.4)
LYMPHOCYTES # BLD AUTO: 13 % — LOW (ref 20.5–51.1)
MCHC RBC-ENTMCNC: 28.8 PG — SIGNIFICANT CHANGE UP (ref 27–31)
MCHC RBC-ENTMCNC: 33.9 G/DL — SIGNIFICANT CHANGE UP (ref 32–37)
MCV RBC AUTO: 85.1 FL — SIGNIFICANT CHANGE UP (ref 81–99)
MONOCYTES # BLD AUTO: 0.91 K/UL — HIGH (ref 0.1–0.6)
MONOCYTES NFR BLD AUTO: 8.1 % — SIGNIFICANT CHANGE UP (ref 1.7–9.3)
NEUTROPHILS # BLD AUTO: 8.71 K/UL — HIGH (ref 1.4–6.5)
NEUTROPHILS NFR BLD AUTO: 78 % — HIGH (ref 42.2–75.2)
NRBC # BLD: 0 /100 WBCS — SIGNIFICANT CHANGE UP (ref 0–0)
PLATELET # BLD AUTO: 373 K/UL — SIGNIFICANT CHANGE UP (ref 130–400)
POTASSIUM SERPL-MCNC: 4.2 MMOL/L — SIGNIFICANT CHANGE UP (ref 3.5–5)
POTASSIUM SERPL-SCNC: 4.2 MMOL/L — SIGNIFICANT CHANGE UP (ref 3.5–5)
RBC # BLD: 4.23 M/UL — SIGNIFICANT CHANGE UP (ref 4.2–5.4)
RBC # FLD: 13.6 % — SIGNIFICANT CHANGE UP (ref 11.5–14.5)
RHEUMATOID FACT SERPL-ACNC: <10 IU/ML — SIGNIFICANT CHANGE UP (ref 0–13)
SODIUM SERPL-SCNC: 140 MMOL/L — SIGNIFICANT CHANGE UP (ref 135–146)
SPECIMEN SOURCE: SIGNIFICANT CHANGE UP
WBC # BLD: 11.17 K/UL — HIGH (ref 4.8–10.8)
WBC # FLD AUTO: 11.17 K/UL — HIGH (ref 4.8–10.8)

## 2018-10-19 RX ORDER — THIAMINE MONONITRATE (VIT B1) 100 MG
100 TABLET ORAL DAILY
Qty: 0 | Refills: 0 | Status: DISCONTINUED | OUTPATIENT
Start: 2018-10-19 | End: 2018-11-01

## 2018-10-19 RX ADMIN — ATORVASTATIN CALCIUM 40 MILLIGRAM(S): 80 TABLET, FILM COATED ORAL at 21:37

## 2018-10-19 RX ADMIN — Medication 50 MILLIGRAM(S): at 06:01

## 2018-10-19 RX ADMIN — LISINOPRIL 40 MILLIGRAM(S): 2.5 TABLET ORAL at 06:01

## 2018-10-19 RX ADMIN — HEPARIN SODIUM 5000 UNIT(S): 5000 INJECTION INTRAVENOUS; SUBCUTANEOUS at 06:02

## 2018-10-19 RX ADMIN — PANTOPRAZOLE SODIUM 40 MILLIGRAM(S): 20 TABLET, DELAYED RELEASE ORAL at 06:01

## 2018-10-19 RX ADMIN — Medication 100 MILLIGRAM(S): at 11:29

## 2018-10-19 RX ADMIN — HEPARIN SODIUM 5000 UNIT(S): 5000 INJECTION INTRAVENOUS; SUBCUTANEOUS at 13:44

## 2018-10-19 RX ADMIN — POLYETHYLENE GLYCOL 3350 17 GRAM(S): 17 POWDER, FOR SOLUTION ORAL at 06:03

## 2018-10-19 RX ADMIN — Medication 81 MILLIGRAM(S): at 11:29

## 2018-10-19 RX ADMIN — Medication 100 MILLIGRAM(S): at 06:01

## 2018-10-19 RX ADMIN — HEPARIN SODIUM 5000 UNIT(S): 5000 INJECTION INTRAVENOUS; SUBCUTANEOUS at 21:37

## 2018-10-19 RX ADMIN — LACTULOSE 20 GRAM(S): 10 SOLUTION ORAL at 06:02

## 2018-10-19 RX ADMIN — Medication 18.67 MILLIGRAM(S): at 06:00

## 2018-10-19 NOTE — PROGRESS NOTE ADULT - ASSESSMENT
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   -MRI brain e/eout cont 10/16 -  shows extensive white mater signals abnormality in BL frontal lobe and right parietal lobe indicating infectious/inflammatory encephalitis  -EEG - 10/15 - no seizure activity  - Neurology recs to start on steroids 10/17   -CTA 10/18 showed no signs of vasculitis.  -NS is on board, Neuro and NS are planing to get Brain biopsy - Family meeting is scheduled for today at about 2pm  -wbc count 11.14 10/17  -Thyroglobulin Ab <20.0 10/18  -Vitamin B12 981, folate 9.0, ammonia 20 10/16  -Anti SSA, anti SSA <0.2, RF <10, thyroperoxidase Ab <10, DsDNA <12, ANDRIY negative, Lyme negative 10/18  -LP results and protein 14-3-3 results - pending reports from [facility], will try getting them today again    #DLD  -c/w atorvastatin    #constipation  -On Miralax, colace and lactulose    #HTN  -controlled. c/w lisinopril and metoprolol    #DVT ppx  -hep sub q      PMD- Dr. Annmarie Ballard [phone]  Cardiologist - Dr Shah [phone]  ID - Dr. Sue Ferreira [phone]

## 2018-10-19 NOTE — SWALLOW BEDSIDE ASSESSMENT ADULT - ORAL PHASE
+bolus hold/Delayed oral transit time Delayed oral transit time Within functional limits clears w/ consecutive swallow or liquid wash/Lingual stasis

## 2018-10-19 NOTE — PROGRESS NOTE ADULT - ASSESSMENT
89 year old woman with a unsusal rapidly progressive white matter disease bifrontal prominently- patient seems to have some repsonse to steroids-    Increase methylprednisone to 1 gm/day-  give additional 250 mg today    discussed with her son, Crow-  patient seems to respond to steroids- will continue IV for 5 days- followed by oral Prednisone-  if patient worsens or has new neurological findings- consider treating with  another immune suppresive therapy (cellcept)-  repeat MRI in 2 weeks or earlier if acute deterioration-    explained to son that I am not sure whether patient will completely respond to steroids or relapse- given her age - need to be gentle with immunesuppression-  prognosis is still guarded

## 2018-10-19 NOTE — PROGRESS NOTE ADULT - SUBJECTIVE AND OBJECTIVE BOX
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 This morning she is resting comfortably in bed and reports no new issues or overnight events.     PAST MEDICAL & SURGICAL HISTORY  Hyperlipidemia  HTN (hypertension)  No significant past surgical history    SOCIAL HISTORY:  Negative for smoking/alcohol/drug use.     ALLERGIES:  No Known Allergies    MEDICATIONS:  STANDING MEDICATIONS  aspirin  chewable 81 milliGRAM(s) Oral daily  atorvastatin 40 milliGRAM(s) Oral at bedtime  docusate sodium 100 milliGRAM(s) Oral two times a day  heparin  Injectable 5000 Unit(s) SubCutaneous every 8 hours  lactulose Syrup 20 Gram(s) Oral every 4 hours  lisinopril 40 milliGRAM(s) Oral daily  methylPREDNISolone sodium succinate IVPB 750 milliGRAM(s) IV Intermittent daily  metoprolol succinate ER 50 milliGRAM(s) Oral daily  pantoprazole    Tablet 40 milliGRAM(s) Oral before breakfast  polyethylene glycol 3350 17 Gram(s) Oral every 12 hours  thiamine Injectable 100 milliGRAM(s) IntraMuscular daily    PRN MEDICATIONS  acetaminophen   Tablet .. 650 milliGRAM(s) Oral every 6 hours PRN    Home Medications:  aspirin 81 mg oral tablet: 1 tab(s) orally once a day (14 Oct 2018 06:09)  atorvastatin 40 mg oral tablet: 1 tab(s) orally once a day (at bedtime) (14 Oct 2018 06:09)  docusate sodium 100 mg oral tablet: 1 tab(s) orally 2 times a day (14 Oct 2018 06:09)  metoprolol succinate 50 mg oral tablet, extended release: 1 tab(s) orally once a day (14 Oct 2018 06:09)  ramipril 10 mg oral tablet: 1 tab(s) orally 2 times a day (14 Oct 2018 06:09)      VITALS:   T(F): 96.6  HR: 63  BP: 166/70  RR: 18  SpO2: --    LABS:                        12.2   11.17 )-----------( 373      ( 19 Oct 2018 07:05 )             36.0     10-19    140  |  100  |  22<H>  ----------------------------<  107<H>  4.2   |  27  |  0.6<L>    Ca    9.4      19 Oct 2018 07:05      RADIOLOGY:  < from: CT Angio Head w/ IV Cont (10.18.18 @ 09:52) >  Impression:  Unremarkable CT angiogram of the head. No vessel irregularity or stenosis   to suggest vasculitis.    < from: MR Head w/wo IV Cont (10.16.18 @ 17:01) >  IMPRESSION:   Extensive expansile nonenhancing white matter signal abnormality   involving the right greater than left frontal lobes and the right   parietal lobe which is progressed since the prior MRI of 10/3/2018.  Findings are nonspecific and suggestive of an extensive inflammatory or   infectious encephalitis.    < from: Xray Chest 1 View- PORTABLE-Routine (10.14.18 @ 10:39) >  Impression:    No radiographic evidence of acute cardiopulmonary disease.        PHYSICAL EXAM:  GEN: No acute distress  LUNGS: Clear to auscultation bilaterally   HEART: S1/S2 present. RRR.   ABD: Soft, non-tender, non-distended. Bowel sounds present  NEURO: Pt told her name ONLY, could not tell the name of place, , very slow to response. strength 4/5 left upper extremity, As per patient, not able to lift her left leg but can move it slightly. Right sided facial droop.

## 2018-10-19 NOTE — SWALLOW BEDSIDE ASSESSMENT ADULT - SLP PERTINENT HISTORY OF CURRENT PROBLEM
neuro note->pt being treated for frontal demyelination disease. as per chart, pt seems to have some response to steroids

## 2018-10-19 NOTE — PROGRESS NOTE ADULT - SUBJECTIVE AND OBJECTIVE BOX
Neurology Progress Note    Interval History:      patient is more awake- but fluctutaes- no new events-  when I saw her today- sitting in her chair and recognized me- says "hi" immediately    PAST MEDICAL & SURGICAL HISTORY:  Hyperlipidemia  HTN (hypertension)  No significant past surgical history          Vital Signs Last 24 Hrs  T(C): 35.9 (19 Oct 2018 07:15), Max: 36.4 (18 Oct 2018 16:10)  T(F): 96.6 (19 Oct 2018 07:15), Max: 97.6 (18 Oct 2018 16:10)  HR: 63 (19 Oct 2018 07:15) (63 - 76)  BP: 166/70 (19 Oct 2018 07:15) (131/58 - 166/70)  BP(mean): --  RR: 18 (19 Oct 2018 07:15) (18 - 18)  SpO2: --    Neurological Exam:   awake- answering questions- recognized her  and son- told me he was her middle child and gave his year of birth-  rigidity persists- grasp reflex+ on right side    acetaminophen   Tablet .. 650 milliGRAM(s) Oral every 6 hours PRN  aspirin  chewable 81 milliGRAM(s) Oral daily  atorvastatin 40 milliGRAM(s) Oral at bedtime  docusate sodium 100 milliGRAM(s) Oral two times a day  heparin  Injectable 5000 Unit(s) SubCutaneous every 8 hours  lisinopril 40 milliGRAM(s) Oral daily  methylPREDNISolone sodium succinate IVPB 750 milliGRAM(s) IV Intermittent daily  metoprolol succinate ER 50 milliGRAM(s) Oral daily  pantoprazole    Tablet 40 milliGRAM(s) Oral before breakfast  polyethylene glycol 3350 17 Gram(s) Oral every 12 hours  thiamine 100 milliGRAM(s) Oral daily      Labs:  CBC Full  -  ( 19 Oct 2018 07:05 )  WBC Count : 11.17 K/uL  Hemoglobin : 12.2 g/dL  Hematocrit : 36.0 %  Platelet Count - Automated : 373 K/uL  Mean Cell Volume : 85.1 fL  Mean Cell Hemoglobin : 28.8 pg  Mean Cell Hemoglobin Concentration : 33.9 g/dL  Auto Neutrophil # : 8.71 K/uL  Auto Lymphocyte # : 1.45 K/uL  Auto Monocyte # : 0.91 K/uL  Auto Eosinophil # : 0.00 K/uL  Auto Basophil # : 0.01 K/uL  Auto Neutrophil % : 78.0 %  Auto Lymphocyte % : 13.0 %  Auto Monocyte % : 8.1 %  Auto Eosinophil % : 0.0 %  Auto Basophil % : 0.1 %    10-19    140  |  100  |  22<H>  ----------------------------<  107<H>  4.2   |  27  |  0.6<L>    Ca    9.4      19 Oct 2018 07:05

## 2018-10-19 NOTE — SWALLOW BEDSIDE ASSESSMENT ADULT - SWALLOW EVAL: DIAGNOSIS
+suspected pharyngeal dysphagia for thin liquids; +toleration for nectar-thick liquids, puree, and soft consistencies.

## 2018-10-20 LAB
ANION GAP SERPL CALC-SCNC: 13 MMOL/L — SIGNIFICANT CHANGE UP (ref 7–14)
BASOPHILS # BLD AUTO: 0.01 K/UL — SIGNIFICANT CHANGE UP (ref 0–0.2)
BASOPHILS NFR BLD AUTO: 0.1 % — SIGNIFICANT CHANGE UP (ref 0–1)
BUN SERPL-MCNC: 22 MG/DL — HIGH (ref 10–20)
CALCIUM SERPL-MCNC: 9.1 MG/DL — SIGNIFICANT CHANGE UP (ref 8.5–10.1)
CHLORIDE SERPL-SCNC: 100 MMOL/L — SIGNIFICANT CHANGE UP (ref 98–110)
CO2 SERPL-SCNC: 24 MMOL/L — SIGNIFICANT CHANGE UP (ref 17–32)
CREAT SERPL-MCNC: 0.5 MG/DL — LOW (ref 0.7–1.5)
EOSINOPHIL # BLD AUTO: 0 K/UL — SIGNIFICANT CHANGE UP (ref 0–0.7)
EOSINOPHIL NFR BLD AUTO: 0 % — SIGNIFICANT CHANGE UP (ref 0–8)
GLUCOSE BLDC GLUCOMTR-MCNC: 134 MG/DL — HIGH (ref 70–99)
GLUCOSE BLDC GLUCOMTR-MCNC: 188 MG/DL — HIGH (ref 70–99)
GLUCOSE BLDC GLUCOMTR-MCNC: 238 MG/DL — HIGH (ref 70–99)
GLUCOSE SERPL-MCNC: 111 MG/DL — HIGH (ref 70–99)
HCT VFR BLD CALC: 36.5 % — LOW (ref 37–47)
HGB BLD-MCNC: 12.5 G/DL — SIGNIFICANT CHANGE UP (ref 12–16)
IMM GRANULOCYTES NFR BLD AUTO: 1.6 % — HIGH (ref 0.1–0.3)
LYMPHOCYTES # BLD AUTO: 0.85 K/UL — LOW (ref 1.2–3.4)
LYMPHOCYTES # BLD AUTO: 9.1 % — LOW (ref 20.5–51.1)
MCHC RBC-ENTMCNC: 29 PG — SIGNIFICANT CHANGE UP (ref 27–31)
MCHC RBC-ENTMCNC: 34.2 G/DL — SIGNIFICANT CHANGE UP (ref 32–37)
MCV RBC AUTO: 84.7 FL — SIGNIFICANT CHANGE UP (ref 81–99)
MONOCYTES # BLD AUTO: 0.59 K/UL — SIGNIFICANT CHANGE UP (ref 0.1–0.6)
MONOCYTES NFR BLD AUTO: 6.3 % — SIGNIFICANT CHANGE UP (ref 1.7–9.3)
NEUTROPHILS # BLD AUTO: 7.79 K/UL — HIGH (ref 1.4–6.5)
NEUTROPHILS NFR BLD AUTO: 82.9 % — HIGH (ref 42.2–75.2)
NRBC # BLD: 0 /100 WBCS — SIGNIFICANT CHANGE UP (ref 0–0)
PLATELET # BLD AUTO: 347 K/UL — SIGNIFICANT CHANGE UP (ref 130–400)
POTASSIUM SERPL-MCNC: 4.3 MMOL/L — SIGNIFICANT CHANGE UP (ref 3.5–5)
POTASSIUM SERPL-SCNC: 4.3 MMOL/L — SIGNIFICANT CHANGE UP (ref 3.5–5)
RBC # BLD: 4.31 M/UL — SIGNIFICANT CHANGE UP (ref 4.2–5.4)
RBC # FLD: 13.6 % — SIGNIFICANT CHANGE UP (ref 11.5–14.5)
SODIUM SERPL-SCNC: 137 MMOL/L — SIGNIFICANT CHANGE UP (ref 135–146)
WBC # BLD: 9.39 K/UL — SIGNIFICANT CHANGE UP (ref 4.8–10.8)
WBC # FLD AUTO: 9.39 K/UL — SIGNIFICANT CHANGE UP (ref 4.8–10.8)

## 2018-10-20 RX ADMIN — HEPARIN SODIUM 5000 UNIT(S): 5000 INJECTION INTRAVENOUS; SUBCUTANEOUS at 22:10

## 2018-10-20 RX ADMIN — Medication 81 MILLIGRAM(S): at 11:35

## 2018-10-20 RX ADMIN — PANTOPRAZOLE SODIUM 40 MILLIGRAM(S): 20 TABLET, DELAYED RELEASE ORAL at 05:55

## 2018-10-20 RX ADMIN — Medication 50 MILLIGRAM(S): at 05:55

## 2018-10-20 RX ADMIN — Medication 100 MILLIGRAM(S): at 11:35

## 2018-10-20 RX ADMIN — Medication 100 MILLIGRAM(S): at 05:55

## 2018-10-20 RX ADMIN — HEPARIN SODIUM 5000 UNIT(S): 5000 INJECTION INTRAVENOUS; SUBCUTANEOUS at 06:08

## 2018-10-20 RX ADMIN — LISINOPRIL 40 MILLIGRAM(S): 2.5 TABLET ORAL at 05:55

## 2018-10-20 RX ADMIN — ATORVASTATIN CALCIUM 40 MILLIGRAM(S): 80 TABLET, FILM COATED ORAL at 22:09

## 2018-10-20 RX ADMIN — POLYETHYLENE GLYCOL 3350 17 GRAM(S): 17 POWDER, FOR SOLUTION ORAL at 17:31

## 2018-10-20 RX ADMIN — Medication 14.5 MILLIGRAM(S): at 05:55

## 2018-10-20 RX ADMIN — HEPARIN SODIUM 5000 UNIT(S): 5000 INJECTION INTRAVENOUS; SUBCUTANEOUS at 13:30

## 2018-10-20 RX ADMIN — POLYETHYLENE GLYCOL 3350 17 GRAM(S): 17 POWDER, FOR SOLUTION ORAL at 05:55

## 2018-10-20 RX ADMIN — Medication 100 MILLIGRAM(S): at 17:31

## 2018-10-20 NOTE — PROGRESS NOTE ADULT - ASSESSMENT
#AMS 2/2  likely Aggressive degenerative/inflammatory dz  -ct scan, ct angio and mri negative for mass - ALL DONE AT [facility]  -urinalysis is negative for acute uti 10/14  -blood culture - negative 10/14  -ESR 61, CRP 6.68 - 10/14  -FTA-ABS is negative  -Serum Na 129 10/14  -neurology consult for ams - recs - paraneoplastic workup, exclude GI malignancy, LP results from [facility] are negative for any bacterial fungal growth.   -MRI brain e/eout cont 10/16 -  shows extensive white mater signals abnormality in BL frontal lobe and right parietal lobe indicating infectious/inflammatory encephalitis  -EEG - 10/15 - no seizure activity  - Neurology recs to start on steroids 10/17   -CTA 10/18 showed no signs of vasculitis.  -N-Thyroglobulin Ab <20.0 10/18  -Vitamin B12 981, folate 9.0, ammonia 20 10/16  -Anti SSA, anti SSA <0.2, RF <10, thyroperoxidase Ab <10, DsDNA <12, ANDRIY negative, Lyme negative 10/18  -LP results and protein 14-3-3 results from [facility] mostly unremarkable  -on Solumedrol 1gm Q24    #DLD  -c/w atorvastatin    #constipation  -On Miralax, colace and lactulose    #HTN  -controlled. c/w lisinopril and metoprolol    #DVT ppx  -hep sub q      Poor Px.

## 2018-10-20 NOTE — PROGRESS NOTE ADULT - SUBJECTIVE AND OBJECTIVE BOX
89 year old woman- with elvi extensive rt>left WMLS- rapidly progressive clinical and radiologic presentation on empiric steroid therapy-    today doing okay- smiles at me- answers questions- stares for periods of time- looks more ot right- had a lot of difficulty bringing her attention to left- can show me how to brush her teeth,, throw a ball  recognizes family members, makes a joke at them    will get 24 EEG for the staring episodes    continue Steroids- 1gm/ day X5 days- then oral prednisone 40 mg/day

## 2018-10-20 NOTE — PROGRESS NOTE ADULT - SUBJECTIVE AND OBJECTIVE BOX
SUBJECTIVE:  Patient is a 89y old Female who presented with a chief complaint of altered mental status (18 Oct 2018 14:51)  Currently admitted to medicine with the primary diagnosis of Altered mental status     INTERVAL HISTORY;  Today is hospital day 6d. This morning she is resting comfortably in bed and reports no new issues or overnight events.     PAST MEDICAL & SURGICAL HISTORY  Hyperlipidemia  HTN (hypertension)  No significant past surgical history    SOCIAL HISTORY:  Negative for smoking/alcohol/drug use.     ALLERGIES:  No Known Allergies    MEDICATIONS:  STANDING MEDICATIONS  aspirin  chewable 81 milliGRAM(s) Oral daily  atorvastatin 40 milliGRAM(s) Oral at bedtime  docusate sodium 100 milliGRAM(s) Oral two times a day  heparin  Injectable 5000 Unit(s) SubCutaneous every 8 hours  lactulose Syrup 20 Gram(s) Oral every 4 hours  lisinopril 40 milliGRAM(s) Oral daily  methylPREDNISolone sodium succinate IVPB 750 milliGRAM(s) IV Intermittent daily  metoprolol succinate ER 50 milliGRAM(s) Oral daily  pantoprazole    Tablet 40 milliGRAM(s) Oral before breakfast  polyethylene glycol 3350 17 Gram(s) Oral every 12 hours  thiamine Injectable 100 milliGRAM(s) IntraMuscular daily    PRN MEDICATIONS  acetaminophen   Tablet .. 650 milliGRAM(s) Oral every 6 hours PRN    Home Medications:  aspirin 81 mg oral tablet: 1 tab(s) orally once a day (14 Oct 2018 06:09)  atorvastatin 40 mg oral tablet: 1 tab(s) orally once a day (at bedtime) (14 Oct 2018 06:09)  docusate sodium 100 mg oral tablet: 1 tab(s) orally 2 times a day (14 Oct 2018 06:09)  metoprolol succinate 50 mg oral tablet, extended release: 1 tab(s) orally once a day (14 Oct 2018 06:09)  ramipril 10 mg oral tablet: 1 tab(s) orally 2 times a day (14 Oct 2018 06:09)      Vital Signs Last 24 Hrs  T(C): 36 (20 Oct 2018 07:22), Max: 36.1 (19 Oct 2018 23:45)  T(F): 96.8 (20 Oct 2018 07:22), Max: 97 (19 Oct 2018 23:45)  HR: 66 (20 Oct 2018 07:22) (64 - 66)  BP: 149/67 (20 Oct 2018 07:22) (129/60 - 150/67)  BP(mean): --  RR: 18 (20 Oct 2018 07:22) (18 - 18)  SpO2: --    LABS:                        12.2   11.17 )-----------( 373      ( 19 Oct 2018 07:05 )             36.0     10-19    140  |  100  |  22<H>  ----------------------------<  107<H>  4.2   |  27  |  0.6<L>    Ca    9.4      19 Oct 2018 07:05      RADIOLOGY:  < from: CT Angio Head w/ IV Cont (10.18.18 @ 09:52) >  Impression:  Unremarkable CT angiogram of the head. No vessel irregularity or stenosis   to suggest vasculitis.    < from: MR Head w/wo IV Cont (10.16.18 @ 17:01) >  IMPRESSION:   Extensive expansile nonenhancing white matter signal abnormality   involving the right greater than left frontal lobes and the right   parietal lobe which is progressed since the prior MRI of 10/3/2018.  Findings are nonspecific and suggestive of an extensive inflammatory or   infectious encephalitis.    < from: Xray Chest 1 View- PORTABLE-Routine (10.14.18 @ 10:39) >  Impression:    No radiographic evidence of acute cardiopulmonary disease.        PHYSICAL EXAM:  GEN: No acute distress  LUNGS: Clear to auscultation bilaterally   HEART: S1/S2 present. RRR.   ABD: Soft, non-tender, non-distended. Bowel sounds present  NEURO: TEJEDA R>L, AAOx1-2

## 2018-10-21 LAB
ANION GAP SERPL CALC-SCNC: 14 MMOL/L — SIGNIFICANT CHANGE UP (ref 7–14)
BASOPHILS # BLD AUTO: 0.01 K/UL — SIGNIFICANT CHANGE UP (ref 0–0.2)
BASOPHILS NFR BLD AUTO: 0.1 % — SIGNIFICANT CHANGE UP (ref 0–1)
BUN SERPL-MCNC: 23 MG/DL — HIGH (ref 10–20)
CALCIUM SERPL-MCNC: 8.8 MG/DL — SIGNIFICANT CHANGE UP (ref 8.5–10.1)
CHLORIDE SERPL-SCNC: 100 MMOL/L — SIGNIFICANT CHANGE UP (ref 98–110)
CO2 SERPL-SCNC: 23 MMOL/L — SIGNIFICANT CHANGE UP (ref 17–32)
CREAT SERPL-MCNC: 0.7 MG/DL — SIGNIFICANT CHANGE UP (ref 0.7–1.5)
EOSINOPHIL # BLD AUTO: 0 K/UL — SIGNIFICANT CHANGE UP (ref 0–0.7)
EOSINOPHIL NFR BLD AUTO: 0 % — SIGNIFICANT CHANGE UP (ref 0–8)
GLUCOSE BLDC GLUCOMTR-MCNC: 115 MG/DL — HIGH (ref 70–99)
GLUCOSE BLDC GLUCOMTR-MCNC: 147 MG/DL — HIGH (ref 70–99)
GLUCOSE BLDC GLUCOMTR-MCNC: 147 MG/DL — HIGH (ref 70–99)
GLUCOSE BLDC GLUCOMTR-MCNC: 168 MG/DL — HIGH (ref 70–99)
GLUCOSE SERPL-MCNC: 98 MG/DL — SIGNIFICANT CHANGE UP (ref 70–99)
HCT VFR BLD CALC: 33.1 % — LOW (ref 37–47)
HGB BLD-MCNC: 11 G/DL — LOW (ref 12–16)
IMM GRANULOCYTES NFR BLD AUTO: 1.4 % — HIGH (ref 0.1–0.3)
LYMPHOCYTES # BLD AUTO: 1 K/UL — LOW (ref 1.2–3.4)
LYMPHOCYTES # BLD AUTO: 12.7 % — LOW (ref 20.5–51.1)
MCHC RBC-ENTMCNC: 28.3 PG — SIGNIFICANT CHANGE UP (ref 27–31)
MCHC RBC-ENTMCNC: 33.2 G/DL — SIGNIFICANT CHANGE UP (ref 32–37)
MCV RBC AUTO: 85.1 FL — SIGNIFICANT CHANGE UP (ref 81–99)
MONOCYTES # BLD AUTO: 0.7 K/UL — HIGH (ref 0.1–0.6)
MONOCYTES NFR BLD AUTO: 8.9 % — SIGNIFICANT CHANGE UP (ref 1.7–9.3)
NEUTROPHILS # BLD AUTO: 6.04 K/UL — SIGNIFICANT CHANGE UP (ref 1.4–6.5)
NEUTROPHILS NFR BLD AUTO: 76.9 % — HIGH (ref 42.2–75.2)
NRBC # BLD: 0 /100 WBCS — SIGNIFICANT CHANGE UP (ref 0–0)
PLATELET # BLD AUTO: 316 K/UL — SIGNIFICANT CHANGE UP (ref 130–400)
POTASSIUM SERPL-MCNC: 4.2 MMOL/L — SIGNIFICANT CHANGE UP (ref 3.5–5)
POTASSIUM SERPL-SCNC: 4.2 MMOL/L — SIGNIFICANT CHANGE UP (ref 3.5–5)
RBC # BLD: 3.89 M/UL — LOW (ref 4.2–5.4)
RBC # FLD: 13.6 % — SIGNIFICANT CHANGE UP (ref 11.5–14.5)
SODIUM SERPL-SCNC: 137 MMOL/L — SIGNIFICANT CHANGE UP (ref 135–146)
WBC # BLD: 7.86 K/UL — SIGNIFICANT CHANGE UP (ref 4.8–10.8)
WBC # FLD AUTO: 7.86 K/UL — SIGNIFICANT CHANGE UP (ref 4.8–10.8)

## 2018-10-21 RX ADMIN — HEPARIN SODIUM 5000 UNIT(S): 5000 INJECTION INTRAVENOUS; SUBCUTANEOUS at 21:42

## 2018-10-21 RX ADMIN — PANTOPRAZOLE SODIUM 40 MILLIGRAM(S): 20 TABLET, DELAYED RELEASE ORAL at 06:00

## 2018-10-21 RX ADMIN — Medication 81 MILLIGRAM(S): at 12:16

## 2018-10-21 RX ADMIN — HEPARIN SODIUM 5000 UNIT(S): 5000 INJECTION INTRAVENOUS; SUBCUTANEOUS at 13:42

## 2018-10-21 RX ADMIN — Medication 100 MILLIGRAM(S): at 05:44

## 2018-10-21 RX ADMIN — HEPARIN SODIUM 5000 UNIT(S): 5000 INJECTION INTRAVENOUS; SUBCUTANEOUS at 05:41

## 2018-10-21 RX ADMIN — POLYETHYLENE GLYCOL 3350 17 GRAM(S): 17 POWDER, FOR SOLUTION ORAL at 05:44

## 2018-10-21 RX ADMIN — Medication 100 MILLIGRAM(S): at 18:22

## 2018-10-21 RX ADMIN — Medication 100 MILLIGRAM(S): at 12:17

## 2018-10-21 RX ADMIN — LISINOPRIL 40 MILLIGRAM(S): 2.5 TABLET ORAL at 05:41

## 2018-10-21 RX ADMIN — Medication 50 MILLIGRAM(S): at 05:41

## 2018-10-21 RX ADMIN — Medication 14.5 MILLIGRAM(S): at 05:44

## 2018-10-21 RX ADMIN — POLYETHYLENE GLYCOL 3350 17 GRAM(S): 17 POWDER, FOR SOLUTION ORAL at 18:22

## 2018-10-21 RX ADMIN — ATORVASTATIN CALCIUM 40 MILLIGRAM(S): 80 TABLET, FILM COATED ORAL at 21:42

## 2018-10-21 NOTE — PROGRESS NOTE ADULT - SUBJECTIVE AND OBJECTIVE BOX
SUBJECTIVE:  Patient is a 89y old Female who presented with a chief complaint of altered mental status (18 Oct 2018 14:51)  Currently admitted to medicine with the primary diagnosis of Altered mental status     INTERVAL HISTORY;  Today is hospital day 7d. This morning she is resting comfortably in bed and reports no new issues or overnight events.     PAST MEDICAL & SURGICAL HISTORY  Hyperlipidemia  HTN (hypertension)  No significant past surgical history    SOCIAL HISTORY:  Negative for smoking/alcohol/drug use.     ALLERGIES:  No Known Allergies        ROS: Denies CP, SOB, AP    MEDICATIONS  (STANDING):  aspirin  chewable 81 milliGRAM(s) Oral daily  atorvastatin 40 milliGRAM(s) Oral at bedtime  docusate sodium 100 milliGRAM(s) Oral two times a day  heparin  Injectable 5000 Unit(s) SubCutaneous every 8 hours  lisinopril 40 milliGRAM(s) Oral daily  methylPREDNISolone sodium succinate IVPB 1000 milliGRAM(s) IV Intermittent daily  metoprolol succinate ER 50 milliGRAM(s) Oral daily  pantoprazole    Tablet 40 milliGRAM(s) Oral before breakfast  polyethylene glycol 3350 17 Gram(s) Oral every 12 hours  thiamine 100 milliGRAM(s) Oral daily    MEDICATIONS  (PRN):  acetaminophen   Tablet .. 650 milliGRAM(s) Oral every 6 hours PRN Moderate Pain (4 - 6)    Home Medications:  aspirin 81 mg oral tablet: 1 tab(s) orally once a day (14 Oct 2018 06:09)  atorvastatin 40 mg oral tablet: 1 tab(s) orally once a day (at bedtime) (14 Oct 2018 06:09)  docusate sodium 100 mg oral tablet: 1 tab(s) orally 2 times a day (14 Oct 2018 06:09)  metoprolol succinate 50 mg oral tablet, extended release: 1 tab(s) orally once a day (14 Oct 2018 06:09)  ramipril 10 mg oral tablet: 1 tab(s) orally 2 times a day (14 Oct 2018 06:09)    Vital Signs Last 24 Hrs  T(C): 35.7 (21 Oct 2018 07:33), Max: 36.5 (20 Oct 2018 15:50)  T(F): 96.3 (21 Oct 2018 07:33), Max: 97.7 (20 Oct 2018 15:50)  HR: 67 (21 Oct 2018 07:33) (67 - 76)  BP: 161/70 (21 Oct 2018 07:33) (129/61 - 161/70)  BP(mean): --  RR: 18 (21 Oct 2018 07:33) (18 - 18)  SpO2: 97% (21 Oct 2018 08:49) (97% - 97%)  CAPILLARY BLOOD GLUCOSE      POCT Blood Glucose.: 168 mg/dL (21 Oct 2018 11:37)  POCT Blood Glucose.: 115 mg/dL (21 Oct 2018 06:39)  POCT Blood Glucose.: 188 mg/dL (20 Oct 2018 21:42)  POCT Blood Glucose.: 134 mg/dL (20 Oct 2018 16:59)    LABS:                        11.0   7.86  )-----------( 316      ( 21 Oct 2018 07:09 )             33.1     10-21    137  |  100  |  23<H>  ----------------------------<  98  4.2   |  23  |  0.7    Ca    8.8      21 Oct 2018 07:09                        Consultant(s) Notes Reviewed:  [x ] YES  [ ] NO  Care Discussed with Consultants/Other Providers/ Housestaff [ x] YES  [ ] NO  Radiology personally reviewed.            RADIOLOGY:  < from: CT Angio Head w/ IV Cont (10.18.18 @ 09:52) >  Impression:  Unremarkable CT angiogram of the head. No vessel irregularity or stenosis   to suggest vasculitis.    < from: MR Head w/wo IV Cont (10.16.18 @ 17:01) >  IMPRESSION:   Extensive expansile nonenhancing white matter signal abnormality   involving the right greater than left frontal lobes and the right   parietal lobe which is progressed since the prior MRI of 10/3/2018.  Findings are nonspecific and suggestive of an extensive inflammatory or   infectious encephalitis.    < from: Xray Chest 1 View- PORTABLE-Routine (10.14.18 @ 10:39) >  Impression:    No radiographic evidence of acute cardiopulmonary disease.        PHYSICAL EXAM:  GEN: No acute distress  LUNGS: Clear to auscultation bilaterally   HEART: S1/S2 present. RRR.   ABD: Soft, non-tender, non-distended. Bowel sounds present  NEURO: TEJEDA R>L, AAOx1-2

## 2018-10-21 NOTE — PROGRESS NOTE ADULT - ASSESSMENT
#AMS 2/2  likely Aggressive degenerative/inflammatory dz  -ct scan, ct angio and mri negative for mass - ALL DONE AT [facility]  -urinalysis is negative for acute uti 10/14  -blood culture - negative 10/14  -ESR 61, CRP 6.68 - 10/14  -FTA-ABS is negative  -Serum Na 129 10/14  -neurology consult for ams - recs - paraneoplastic workup, exclude GI malignancy, LP results from [facility] are negative for any bacterial fungal growth.   -MRI brain e/eout cont 10/16 -  shows extensive white mater signals abnormality in BL frontal lobe and right parietal lobe indicating infectious/inflammatory encephalitis  -EEG - 10/15 - no seizure activity  - Neurology recs to start on steroids 10/17   -CTA 10/18 showed no signs of vasculitis.  -N-Thyroglobulin Ab <20.0 10/18  -Vitamin B12 981, folate 9.0, ammonia 20 10/16  -Anti SSA, anti SSA <0.2, RF <10, thyroperoxidase Ab <10, DsDNA <12, ANDRIY negative, Lyme negative 10/18  -LP results and protein 14-3-3 results from [facility] mostly unremarkable  -on Solumedrol 1gm Q24  -d/w neuro, will transition to Prednisone 40mg after 5days    #DLD  -c/w atorvastatin    #constipation  -On Miralax, colace and lactulose    #HTN  -controlled. c/w lisinopril and metoprolol    #DVT ppx  -hep sub q      Poor Px.

## 2018-10-21 NOTE — PROGRESS NOTE ADULT - SUBJECTIVE AND OBJECTIVE BOX
SUBJECTIVE:    Patient is a 89y old Female who presents with a chief complaint of altered mental status (21 Oct 2018 14:28)    Currently admitted to medicine with the primary diagnosis of    Today is hospital day 7d. This morning she is resting comfortably in bed and reports no new issues or overnight events.     PAST MEDICAL & SURGICAL HISTORY  Hyperlipidemia  HTN (hypertension)  No significant past surgical history    ALLERGIES:  No Known Allergies    MEDICATIONS:  STANDING MEDICATIONS  aspirin  chewable 81 milliGRAM(s) Oral daily  atorvastatin 40 milliGRAM(s) Oral at bedtime  docusate sodium 100 milliGRAM(s) Oral two times a day  heparin  Injectable 5000 Unit(s) SubCutaneous every 8 hours  lisinopril 40 milliGRAM(s) Oral daily  methylPREDNISolone sodium succinate IVPB 1000 milliGRAM(s) IV Intermittent daily  metoprolol succinate ER 50 milliGRAM(s) Oral daily  pantoprazole    Tablet 40 milliGRAM(s) Oral before breakfast  polyethylene glycol 3350 17 Gram(s) Oral every 12 hours  thiamine 100 milliGRAM(s) Oral daily    PRN MEDICATIONS  acetaminophen   Tablet .. 650 milliGRAM(s) Oral every 6 hours PRN    VITALS:   T(F): 98.3  HR: 69  BP: 164/72  RR: 18  SpO2: 97%    LABS:                        11.0   7.86  )-----------( 316      ( 21 Oct 2018 07:09 )             33.1     10-21    137  |  100  |  23<H>  ----------------------------<  98  4.2   |  23  |  0.7    Ca    8.8      21 Oct 2018 07:09          PHYSICAL EXAM:  GEN: No acute distress  LUNGS: Clear to auscultation bilaterally   HEART: Regular  ABD: Soft, non-tender, non-distended.  EXT: NC/NC/NE/2+PP/TEJEDA/Skin Intact.   NEURO: AAOX3               TEJEDA R>L

## 2018-10-21 NOTE — PROGRESS NOTE ADULT - ASSESSMENT
#AMS 2/2  likely Aggressive degenerative/inflammatory dz  -ct scan, ct angio and mri negative for mass - ALL DONE AT [facility]  -urinalysis is negative for acute uti 10/14  -blood culture - negative 10/14  -ESR 61, CRP 6.68 - 10/14  -FTA-ABS is negative  -Serum Na 129 10/14  -neurology consult for ams - recs - paraneoplastic workup, exclude GI malignancy, LP results from [facility] are negative for any bacterial fungal growth.   -MRI brain e/eout cont 10/16 -  shows extensive white mater signals abnormality in BL frontal lobe and right parietal lobe indicating infectious/inflammatory encephalitis  -EEG - 10/15 - no seizure activity  - Neurology recs to start on steroids 10/17   -CTA 10/18 showed no signs of vasculitis.  -N-Thyroglobulin Ab <20.0 10/18  -Vitamin B12 981, folate 9.0, ammonia 20 10/16  -Anti SSA, anti SSA <0.2, RF <10, thyroperoxidase Ab <10, DsDNA <12, ANDRIY negative, Lyme negative 10/18  -LP results and protein 14-3-3 results from [facility] mostly unremarkable  -on Solumedrol 1gm Q24  -d/w neuro, will transition to Prednisone 40mg after 5days    #DLD  -c/w atorvastatin    #constipation  -On Miralax, colace and lactulose    #HTN  -controlled. c/w lisinopril and metoprolol    #DVT ppx  -hep sub q      Poor Px.

## 2018-10-22 LAB
ANION GAP SERPL CALC-SCNC: 15 MMOL/L — HIGH (ref 7–14)
BASOPHILS # BLD AUTO: 0.02 K/UL — SIGNIFICANT CHANGE UP (ref 0–0.2)
BASOPHILS NFR BLD AUTO: 0.2 % — SIGNIFICANT CHANGE UP (ref 0–1)
BUN SERPL-MCNC: 20 MG/DL — SIGNIFICANT CHANGE UP (ref 10–20)
CALCIUM SERPL-MCNC: 9 MG/DL — SIGNIFICANT CHANGE UP (ref 8.5–10.1)
CHLORIDE SERPL-SCNC: 97 MMOL/L — LOW (ref 98–110)
CO2 SERPL-SCNC: 25 MMOL/L — SIGNIFICANT CHANGE UP (ref 17–32)
CREAT SERPL-MCNC: 0.6 MG/DL — LOW (ref 0.7–1.5)
EOSINOPHIL # BLD AUTO: 0 K/UL — SIGNIFICANT CHANGE UP (ref 0–0.7)
EOSINOPHIL NFR BLD AUTO: 0 % — SIGNIFICANT CHANGE UP (ref 0–8)
GLUCOSE BLDC GLUCOMTR-MCNC: 141 MG/DL — HIGH (ref 70–99)
GLUCOSE BLDC GLUCOMTR-MCNC: 177 MG/DL — HIGH (ref 70–99)
GLUCOSE BLDC GLUCOMTR-MCNC: 280 MG/DL — HIGH (ref 70–99)
GLUCOSE BLDC GLUCOMTR-MCNC: 95 MG/DL — SIGNIFICANT CHANGE UP (ref 70–99)
GLUCOSE SERPL-MCNC: 90 MG/DL — SIGNIFICANT CHANGE UP (ref 70–99)
HCT VFR BLD CALC: 36.6 % — LOW (ref 37–47)
HGB BLD-MCNC: 12.3 G/DL — SIGNIFICANT CHANGE UP (ref 12–16)
IMM GRANULOCYTES NFR BLD AUTO: 2.1 % — HIGH (ref 0.1–0.3)
LYMPHOCYTES # BLD AUTO: 1.37 K/UL — SIGNIFICANT CHANGE UP (ref 1.2–3.4)
LYMPHOCYTES # BLD AUTO: 16.9 % — LOW (ref 20.5–51.1)
MCHC RBC-ENTMCNC: 28.6 PG — SIGNIFICANT CHANGE UP (ref 27–31)
MCHC RBC-ENTMCNC: 33.6 G/DL — SIGNIFICANT CHANGE UP (ref 32–37)
MCV RBC AUTO: 85.1 FL — SIGNIFICANT CHANGE UP (ref 81–99)
MONOCYTES # BLD AUTO: 0.67 K/UL — HIGH (ref 0.1–0.6)
MONOCYTES NFR BLD AUTO: 8.3 % — SIGNIFICANT CHANGE UP (ref 1.7–9.3)
N-METHYL-DA RECEPTOR AB IGG BY CBA-IFA, SERUM W/RFLX TITER: SIGNIFICANT CHANGE UP
NEUTROPHILS # BLD AUTO: 5.88 K/UL — SIGNIFICANT CHANGE UP (ref 1.4–6.5)
NEUTROPHILS NFR BLD AUTO: 72.5 % — SIGNIFICANT CHANGE UP (ref 42.2–75.2)
NRBC # BLD: 0 /100 WBCS — SIGNIFICANT CHANGE UP (ref 0–0)
PLATELET # BLD AUTO: 335 K/UL — SIGNIFICANT CHANGE UP (ref 130–400)
POTASSIUM SERPL-MCNC: 4.3 MMOL/L — SIGNIFICANT CHANGE UP (ref 3.5–5)
POTASSIUM SERPL-SCNC: 4.3 MMOL/L — SIGNIFICANT CHANGE UP (ref 3.5–5)
RBC # BLD: 4.3 M/UL — SIGNIFICANT CHANGE UP (ref 4.2–5.4)
RBC # FLD: 13.5 % — SIGNIFICANT CHANGE UP (ref 11.5–14.5)
SODIUM SERPL-SCNC: 137 MMOL/L — SIGNIFICANT CHANGE UP (ref 135–146)
WBC # BLD: 8.11 K/UL — SIGNIFICANT CHANGE UP (ref 4.8–10.8)
WBC # FLD AUTO: 8.11 K/UL — SIGNIFICANT CHANGE UP (ref 4.8–10.8)

## 2018-10-22 RX ADMIN — LISINOPRIL 40 MILLIGRAM(S): 2.5 TABLET ORAL at 06:38

## 2018-10-22 RX ADMIN — POLYETHYLENE GLYCOL 3350 17 GRAM(S): 17 POWDER, FOR SOLUTION ORAL at 06:40

## 2018-10-22 RX ADMIN — Medication 14.5 MILLIGRAM(S): at 06:38

## 2018-10-22 RX ADMIN — HEPARIN SODIUM 5000 UNIT(S): 5000 INJECTION INTRAVENOUS; SUBCUTANEOUS at 06:39

## 2018-10-22 RX ADMIN — ATORVASTATIN CALCIUM 40 MILLIGRAM(S): 80 TABLET, FILM COATED ORAL at 21:10

## 2018-10-22 RX ADMIN — HEPARIN SODIUM 5000 UNIT(S): 5000 INJECTION INTRAVENOUS; SUBCUTANEOUS at 15:37

## 2018-10-22 RX ADMIN — Medication 100 MILLIGRAM(S): at 17:40

## 2018-10-22 RX ADMIN — HEPARIN SODIUM 5000 UNIT(S): 5000 INJECTION INTRAVENOUS; SUBCUTANEOUS at 21:13

## 2018-10-22 RX ADMIN — Medication 100 MILLIGRAM(S): at 11:40

## 2018-10-22 RX ADMIN — Medication 50 MILLIGRAM(S): at 06:38

## 2018-10-22 RX ADMIN — Medication 81 MILLIGRAM(S): at 11:40

## 2018-10-22 RX ADMIN — Medication 100 MILLIGRAM(S): at 06:39

## 2018-10-22 RX ADMIN — PANTOPRAZOLE SODIUM 40 MILLIGRAM(S): 20 TABLET, DELAYED RELEASE ORAL at 06:41

## 2018-10-22 RX ADMIN — POLYETHYLENE GLYCOL 3350 17 GRAM(S): 17 POWDER, FOR SOLUTION ORAL at 17:40

## 2018-10-22 NOTE — PROGRESS NOTE ADULT - SUBJECTIVE AND OBJECTIVE BOX
SUBJECTIVE:  Patient is a 89y old Female who presented with a chief complaint of altered mental status (18 Oct 2018 14:51)  Currently admitted to medicine with the primary diagnosis of Altered mental status     INTERVAL HISTORY;  Today is hospital day 8. This morning she is resting comfortably in bed and reports no new issues or overnight events.     PAST MEDICAL & SURGICAL HISTORY  Hyperlipidemia  HTN (hypertension)  No significant past surgical history    SOCIAL HISTORY:  Negative for smoking/alcohol/drug use.     ALLERGIES:  No Known Allergies          PHYSICAL EXAM:  GEN: No acute distress  LUNGS: Clear to auscultation bilaterally   HEART: S1/S2 present. RRR.   ABD: Soft, non-tender, non-distended. Bowel sounds present  NEURO: TEJEDA R>L, AAOx2          ROS: Denies CP, SOB, AP    MEDICATIONS  (STANDING):  aspirin  chewable 81 milliGRAM(s) Oral daily  atorvastatin 40 milliGRAM(s) Oral at bedtime  docusate sodium 100 milliGRAM(s) Oral two times a day  heparin  Injectable 5000 Unit(s) SubCutaneous every 8 hours  lisinopril 40 milliGRAM(s) Oral daily  metoprolol succinate ER 50 milliGRAM(s) Oral daily  pantoprazole    Tablet 40 milliGRAM(s) Oral before breakfast  polyethylene glycol 3350 17 Gram(s) Oral every 12 hours  thiamine 100 milliGRAM(s) Oral daily    MEDICATIONS  (PRN):  acetaminophen   Tablet .. 650 milliGRAM(s) Oral every 6 hours PRN Moderate Pain (4 - 6)    Home Medications:  aspirin 81 mg oral tablet: 1 tab(s) orally once a day (14 Oct 2018 06:09)  atorvastatin 40 mg oral tablet: 1 tab(s) orally once a day (at bedtime) (14 Oct 2018 06:09)  docusate sodium 100 mg oral tablet: 1 tab(s) orally 2 times a day (14 Oct 2018 06:09)  metoprolol succinate 50 mg oral tablet, extended release: 1 tab(s) orally once a day (14 Oct 2018 06:09)  ramipril 10 mg oral tablet: 1 tab(s) orally 2 times a day (14 Oct 2018 06:09)    Vital Signs Last 24 Hrs  T(C): 36.7 (22 Oct 2018 15:37), Max: 36.7 (22 Oct 2018 15:37)  T(F): 98.1 (22 Oct 2018 15:37), Max: 98.1 (22 Oct 2018 15:37)  HR: 74 (22 Oct 2018 15:37) (63 - 74)  BP: 102/55 (22 Oct 2018 15:37) (102/55 - 170/70)  BP(mean): --  RR: 20 (22 Oct 2018 15:37) (16 - 20)  SpO2: --  CAPILLARY BLOOD GLUCOSE      POCT Blood Glucose.: 280 mg/dL (22 Oct 2018 16:22)  POCT Blood Glucose.: 177 mg/dL (22 Oct 2018 11:14)  POCT Blood Glucose.: 95 mg/dL (22 Oct 2018 06:27)  POCT Blood Glucose.: 147 mg/dL (21 Oct 2018 21:45)    LABS:                        12.3   8.11  )-----------( 335      ( 22 Oct 2018 07:32 )             36.6     10-22    137  |  97<L>  |  20  ----------------------------<  90  4.3   |  25  |  0.6<L>    Ca    9.0      22 Oct 2018 07:32                        Consultant(s) Notes Reviewed:  [x ] YES  [ ] NO  Care Discussed with Consultants/Other Providers/ Housestaff [ x] YES  [ ] NO  Radiology personally reviewed.

## 2018-10-22 NOTE — DIETITIAN INITIAL EVALUATION ADULT. - OTHER INFO
RD assessment as LOS. Pt pt admitted w/ rapidly progressive neurological disease that appears to be inflammatory, likely demyelinating disease. being treated w/ steroids. Prior to admission, pt was on regular diet with thin liquids per SLP note, however now pt on ground up diet with honey thick liquids per most recent SLP recs. Per EMR, pt has been having fluctuating PO intake, ranging from % of meals (however a number of meals have been %). Will continue to monitor intake. + BM 10/21. Skin intact. No edema

## 2018-10-22 NOTE — SWALLOW BEDSIDE ASSESSMENT ADULT - COMMENTS
+mild oral dysphagia w/ overt s/s of penetration/aspiration w/ nectar Mild oral dysphagia w/o overt s/s of penetration/aspiration w/ honey Moderate oral dysphagia w/o overt s/s of penetration/aspiration w/ soft

## 2018-10-22 NOTE — PROGRESS NOTE ADULT - ASSESSMENT
#AMS 2/2  likely Aggressive degenerative/inflammatory dz  -ct scan, ct angio and mri negative for mass - ALL DONE AT [facility]  -urinalysis is negative for acute uti 10/14  -blood culture - negative 10/14  -ESR 61, CRP 6.68 - 10/14  -FTA-ABS is negative  -Serum Na 129 10/14  -neurology consult for ams - recs - paraneoplastic workup, exclude GI malignancy, LP results from [facility] are negative for any bacterial fungal growth.   -MRI brain e/eout cont 10/16 -  shows extensive white mater signals abnormality in BL frontal lobe and right parietal lobe indicating infectious/inflammatory encephalitis  -EEG - 10/15 - no seizure activity  - Neurology recs to start on steroids 10/17   -CTA 10/18 showed no signs of vasculitis.  -N-Thyroglobulin Ab <20.0 10/18  -Vitamin B12 981, folate 9.0, ammonia 20 10/16  -Anti SSA, anti SSA <0.2, RF <10, thyroperoxidase Ab <10, DsDNA <12, ANDRIY negative, Lyme negative 10/18  -LP results and protein 14-3-3 results from [facility] unremarkable  -completed Solumedrol 1gm Q24 course  -d/w neuro, will transition to Prednisone 40mg   -will check IgA and will consider IV Ig    #DLD  -c/w atorvastatin    #constipation  -On Miralax, colace and lactulose    #HTN  -controlled. c/w lisinopril and metoprolol    #DVT ppx  -hep sub q      Poor Px.

## 2018-10-22 NOTE — SWALLOW BEDSIDE ASSESSMENT ADULT - ASR SWALLOW ASPIRATION MONITOR
cough/position upright (90Y)/gurgly voice/throat clearing
fever/pneumonia/upper respiratory infection/cough/position upright (89Y)
throat clearing/gurgly voice/position upright (89Y)/cough
cough/position upright (90Y)/pneumonia/upper respiratory infection
only feed when awake/alert/cough/position upright (90Y)/throat clearing/gurgly voice

## 2018-10-22 NOTE — PROGRESS NOTE ADULT - ASSESSMENT
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

## 2018-10-22 NOTE — SWALLOW BEDSIDE ASSESSMENT ADULT - NS SPL SWALLOW CLINIC TRIAL FT
Mild oral dysphagia w/o overt s/s of penetration/aspiration
+mild oral dysphagia w/o overt s/s of penetration/aspiration w/ puree

## 2018-10-22 NOTE — PROGRESS NOTE ADULT - SUBJECTIVE AND OBJECTIVE BOX
SUBJECTIVE:    Patient is a 89y old Female who presents with a chief complaint of altered mental status (22 Oct 2018 09:33)      HPI:  (((I went to see the patient this night 4:45 am, patient is barely responsive, everything written here is the information I got from the old chart from the [facility].)))(Talked with the daughter also regarding this.)    89 Y/O  with PMH of HTN, Hyperlipidemia went to the ED of [facility] for constipation. Manual disimpaction was done and patient was sent home. Next day patient's personality started changing, Became angry on , a bit combative, went to pmd where ct head was ordered. The Ct showed concern for a brain mass. She was readmitted to [facility] for further workup. Repeat CT head: decreased density in right frontal white matter which is likely related to chronic ischemic change. CT angiogram was unremarkable  MRI of the brain showed multifocal subcortical signal abnormalities with suggestion of cortical involvement. Findings are atypical for small vessel disease. Inflammatory etiology should be suspected. No mass.   urinalysis was done. Mildly positive for uti. She was started on levofloxacin but it was stopped due to a negative urine culture.   Diagnosis of metabolic encephalopathy prob 2/2 to uti was made. slurred speech resolved. (possible tia) , hypokalemia was corrected. LP was done but result has not been mentioned in papers. ECHO showed EF 75-80%, mod AS, mild AR, TR. EEG showed generalized slowing   As per daughter before this episode patient was AAOx3 and they were not satisfied with the workup in [facility] so she was transferred to [facility]. (14 Oct 2018 04:53)      Currently admitted to medicine with the primary diagnosis of      Besides the pertinent positives and negatives described above, the ROS was within normal limits.    PAST MEDICAL & SURGICAL HISTORY  Hyperlipidemia  HTN (hypertension)  No significant past surgical history    SOCIAL HISTORY:    ALLERGIES:  No Known Allergies    MEDICATIONS:  STANDING MEDICATIONS  aspirin  chewable 81 milliGRAM(s) Oral daily  atorvastatin 40 milliGRAM(s) Oral at bedtime  docusate sodium 100 milliGRAM(s) Oral two times a day  heparin  Injectable 5000 Unit(s) SubCutaneous every 8 hours  lisinopril 40 milliGRAM(s) Oral daily  metoprolol succinate ER 50 milliGRAM(s) Oral daily  pantoprazole    Tablet 40 milliGRAM(s) Oral before breakfast  polyethylene glycol 3350 17 Gram(s) Oral every 12 hours  thiamine 100 milliGRAM(s) Oral daily    PRN MEDICATIONS  acetaminophen   Tablet .. 650 milliGRAM(s) Oral every 6 hours PRN    VITALS:   T(F): 98.1  HR: 74  BP: 102/55  RR: 20  SpO2: --    LABS:                        12.3   8.11  )-----------( 335      ( 22 Oct 2018 07:32 )             36.6     10-22    137  |  97<L>  |  20  ----------------------------<  90  4.3   |  25  |  0.6<L>    Ca    9.0      22 Oct 2018 07:32                    RADIOLOGY:    PHYSICAL EXAM:  GEN: No acute distress  LUNGS: Clear to auscultation bilaterally   HEART: Regular  ABD: Soft, non-tender, non-distended.  NEURO: AAOX1, lethargic    Intravenous access:   NG tube:   Olivas Catheter:

## 2018-10-22 NOTE — PROGRESS NOTE ADULT - SUBJECTIVE AND OBJECTIVE BOX
Neurology Progress Note    Interval History:  No acute events overnight. Pt reports she feels okay. Family at bedside notes pt is been at new baseline x 1 week.    HPI:  89 year old woman- with elvi extensive rt>left WMLS- rapidly progressive clinical and radiologic presentation on empiric steroid therapy-    PAST MEDICAL & SURGICAL HISTORY:  Hyperlipidemia  HTN (hypertension)  No significant past surgical history    Vital Signs Last 24 Hrs  T(C): 36.6 (22 Oct 2018 07:29), Max: 36.8 (21 Oct 2018 16:04)  T(F): 97.8 (22 Oct 2018 07:29), Max: 98.3 (21 Oct 2018 16:04)  HR: 68 (22 Oct 2018 07:29) (63 - 69)  BP: 170/70 (22 Oct 2018 07:29) (149/67 - 170/70)  BP(mean): --  RR: 16 (22 Oct 2018 07:29) (16 - 18)  SpO2: --    Neurological Exam:   Mental status: Awake, alert. Comprehension intact. No dysarthria, no aphasia.   Cranial nerves: Pupils equally round and reactive to light, visual field intact in L eye. Unable to assess R eye due to pt's compliance with exam., no nystagmus, extraocular muscles intact, L sided facial droop.  Motor:  MRC grading 2/5 LUE and LLE, 5/5 in RUE and RLE. Normal tone and bulk.  No abnormal movements.    Sensation: Intact to light touch.    acetaminophen   Tablet .. 650 milliGRAM(s) Oral every 6 hours PRN  aspirin  chewable 81 milliGRAM(s) Oral daily  atorvastatin 40 milliGRAM(s) Oral at bedtime  docusate sodium 100 milliGRAM(s) Oral two times a day  heparin  Injectable 5000 Unit(s) SubCutaneous every 8 hours  lisinopril 40 milliGRAM(s) Oral daily  methylPREDNISolone sodium succinate IVPB 1000 milliGRAM(s) IV Intermittent daily  metoprolol succinate ER 50 milliGRAM(s) Oral daily  pantoprazole    Tablet 40 milliGRAM(s) Oral before breakfast  polyethylene glycol 3350 17 Gram(s) Oral every 12 hours  thiamine 100 milliGRAM(s) Oral daily      Labs:  CBC Full  -  ( 22 Oct 2018 07:32 )  WBC Count : 8.11 K/uL  Hemoglobin : 12.3 g/dL  Hematocrit : 36.6 %  Platelet Count - Automated : 335 K/uL  Mean Cell Volume : 85.1 fL  Mean Cell Hemoglobin : 28.6 pg  Mean Cell Hemoglobin Concentration : 33.6 g/dL  Auto Neutrophil # : 5.88 K/uL  Auto Lymphocyte # : 1.37 K/uL  Auto Monocyte # : 0.67 K/uL  Auto Eosinophil # : 0.00 K/uL  Auto Basophil # : 0.02 K/uL  Auto Neutrophil % : 72.5 %  Auto Lymphocyte % : 16.9 %  Auto Monocyte % : 8.3 %  Auto Eosinophil % : 0.0 %  Auto Basophil % : 0.2 %    10-22    137  |  97<L>  |  20  ----------------------------<  90  4.3   |  25  |  0.6<L>    Ca    9.0      22 Oct 2018 07:32    A/P    89 year old woman- with elvi extensive rt>left WMLS- rapidly progressive clinical and radiologic presentation on empiric steroid therapy-    1. Acute AMS  -c/w high dose steroids 1gm/day (day 2 of five) then start 40mg prednisone per day taper  - VEEG results pending Neurology Progress Note    Interval History:  No acute events overnight. Pt reports she feels okay. Family at bedside notes pt is been at new baseline x 1 week.    HPI:  89 year old woman- with elvi extensive rt>left WMLS- rapidly progressive clinical and radiologic presentation on empiric steroid therapy-    PAST MEDICAL & SURGICAL HISTORY:  Hyperlipidemia  HTN (hypertension)  No significant past surgical history    Vital Signs Last 24 Hrs  T(C): 36.6 (22 Oct 2018 07:29), Max: 36.8 (21 Oct 2018 16:04)  T(F): 97.8 (22 Oct 2018 07:29), Max: 98.3 (21 Oct 2018 16:04)  HR: 68 (22 Oct 2018 07:29) (63 - 69)  BP: 170/70 (22 Oct 2018 07:29) (149/67 - 170/70)  BP(mean): --  RR: 16 (22 Oct 2018 07:29) (16 - 18)  SpO2: --    Neurological Exam:   Mental status: Awake, alert. Comprehension intact. No dysarthria, no aphasia.   Cranial nerves: Pupils equally round and reactive to light, visual field intact in L eye. Unable to assess R eye due to pt's compliance with exam., no nystagmus, extraocular muscles intact, L sided facial droop.  Motor:  MRC grading 2/5 LUE and LLE, 5/5 in RUE and RLE. Normal tone and bulk.  No abnormal movements.    Sensation: Intact to light touch.    acetaminophen   Tablet .. 650 milliGRAM(s) Oral every 6 hours PRN  aspirin  chewable 81 milliGRAM(s) Oral daily  atorvastatin 40 milliGRAM(s) Oral at bedtime  docusate sodium 100 milliGRAM(s) Oral two times a day  heparin  Injectable 5000 Unit(s) SubCutaneous every 8 hours  lisinopril 40 milliGRAM(s) Oral daily  methylPREDNISolone sodium succinate IVPB 1000 milliGRAM(s) IV Intermittent daily  metoprolol succinate ER 50 milliGRAM(s) Oral daily  pantoprazole    Tablet 40 milliGRAM(s) Oral before breakfast  polyethylene glycol 3350 17 Gram(s) Oral every 12 hours  thiamine 100 milliGRAM(s) Oral daily      Labs:  CBC Full  -  ( 22 Oct 2018 07:32 )  WBC Count : 8.11 K/uL  Hemoglobin : 12.3 g/dL  Hematocrit : 36.6 %  Platelet Count - Automated : 335 K/uL  Mean Cell Volume : 85.1 fL  Mean Cell Hemoglobin : 28.6 pg  Mean Cell Hemoglobin Concentration : 33.6 g/dL  Auto Neutrophil # : 5.88 K/uL  Auto Lymphocyte # : 1.37 K/uL  Auto Monocyte # : 0.67 K/uL  Auto Eosinophil # : 0.00 K/uL  Auto Basophil # : 0.02 K/uL  Auto Neutrophil % : 72.5 %  Auto Lymphocyte % : 16.9 %  Auto Monocyte % : 8.3 %  Auto Eosinophil % : 0.0 %  Auto Basophil % : 0.2 %    10-22    137  |  97<L>  |  20  ----------------------------<  90  4.3   |  25  |  0.6<L>    Ca    9.0      22 Oct 2018 07:32    A/P    89 year old woman- with elvi extensive rt>left WMLS- rapidly progressive clinical and radiologic presentation on empiric steroid therapy-    1. Acute AMS  -c/w high dose steroids 1gm/day. After 5 days then can stop IV steroids and start 40mg prednisone per day   - VEEG results pending

## 2018-10-22 NOTE — SWALLOW BEDSIDE ASSESSMENT ADULT - SLP PERTINENT HISTORY OF CURRENT PROBLEM
pt admitted w/ rapidly progressive neurological disease that appears to be inflammatory, likely demyelinating disease. being treated w/ steroids

## 2018-10-22 NOTE — SWALLOW BEDSIDE ASSESSMENT ADULT - ORAL PHASE
Delayed oral transit time Decreased anterior-posterior movement of the bolus/Stasis in lateral sulci/Lingual stasis/Delayed oral transit time

## 2018-10-22 NOTE — SWALLOW BEDSIDE ASSESSMENT ADULT - SWALLOW EVAL: DIAGNOSIS
+overt s/s of penetration/aspiration w/ nectar, +toleration of honey and puree w/ mild oral dysphagia w/o overt s/s of penetration/aspiration, +moderate oral dysphagia for soft w/o overt s/s of penetration/aspiration

## 2018-10-23 LAB
ALBUMIN SERPL ELPH-MCNC: 2.7 G/DL — LOW (ref 3.5–5.2)
ALP SERPL-CCNC: 59 U/L — SIGNIFICANT CHANGE UP (ref 30–115)
ALT FLD-CCNC: 22 U/L — SIGNIFICANT CHANGE UP (ref 0–41)
ANION GAP SERPL CALC-SCNC: 10 MMOL/L — SIGNIFICANT CHANGE UP (ref 7–14)
AST SERPL-CCNC: 15 U/L — SIGNIFICANT CHANGE UP (ref 0–41)
BASOPHILS # BLD AUTO: 0.02 K/UL — SIGNIFICANT CHANGE UP (ref 0–0.2)
BASOPHILS NFR BLD AUTO: 0.2 % — SIGNIFICANT CHANGE UP (ref 0–1)
BILIRUB SERPL-MCNC: <0.2 MG/DL — SIGNIFICANT CHANGE UP (ref 0.2–1.2)
BUN SERPL-MCNC: 20 MG/DL — SIGNIFICANT CHANGE UP (ref 10–20)
CALCIUM SERPL-MCNC: 8.5 MG/DL — SIGNIFICANT CHANGE UP (ref 8.5–10.1)
CHLORIDE SERPL-SCNC: 102 MMOL/L — SIGNIFICANT CHANGE UP (ref 98–110)
CO2 SERPL-SCNC: 25 MMOL/L — SIGNIFICANT CHANGE UP (ref 17–32)
CREAT SERPL-MCNC: 0.5 MG/DL — LOW (ref 0.7–1.5)
EOSINOPHIL # BLD AUTO: 0 K/UL — SIGNIFICANT CHANGE UP (ref 0–0.7)
EOSINOPHIL NFR BLD AUTO: 0 % — SIGNIFICANT CHANGE UP (ref 0–8)
GLUCOSE BLDC GLUCOMTR-MCNC: 117 MG/DL — HIGH (ref 70–99)
GLUCOSE BLDC GLUCOMTR-MCNC: 132 MG/DL — HIGH (ref 70–99)
GLUCOSE SERPL-MCNC: 109 MG/DL — HIGH (ref 70–99)
HCT VFR BLD CALC: 33.2 % — LOW (ref 37–47)
HGB BLD-MCNC: 11.1 G/DL — LOW (ref 12–16)
IGA FLD-MCNC: 129 MG/DL — SIGNIFICANT CHANGE UP (ref 84–499)
IGG FLD-MCNC: 796 MG/DL — SIGNIFICANT CHANGE UP (ref 610–1660)
IGM SERPL-MCNC: 91 MG/DL — SIGNIFICANT CHANGE UP (ref 35–242)
IMM GRANULOCYTES NFR BLD AUTO: 3.2 % — HIGH (ref 0.1–0.3)
KAPPA LC SER QL IFE: 0.93 MG/DL — SIGNIFICANT CHANGE UP (ref 0.33–1.94)
KAPPA/LAMBDA FREE LIGHT CHAIN RATIO, SERUM: 0.76 RATIO — SIGNIFICANT CHANGE UP (ref 0.26–1.65)
LAMBDA LC SER QL IFE: 1.22 MG/DL — SIGNIFICANT CHANGE UP (ref 0.57–2.63)
LYMPHOCYTES # BLD AUTO: 1.2 K/UL — SIGNIFICANT CHANGE UP (ref 1.2–3.4)
LYMPHOCYTES # BLD AUTO: 14.3 % — LOW (ref 20.5–51.1)
MAGNESIUM SERPL-MCNC: 2.3 MG/DL — SIGNIFICANT CHANGE UP (ref 1.8–2.4)
MCHC RBC-ENTMCNC: 28.2 PG — SIGNIFICANT CHANGE UP (ref 27–31)
MCHC RBC-ENTMCNC: 33.4 G/DL — SIGNIFICANT CHANGE UP (ref 32–37)
MCV RBC AUTO: 84.3 FL — SIGNIFICANT CHANGE UP (ref 81–99)
MONOCYTES # BLD AUTO: 0.67 K/UL — HIGH (ref 0.1–0.6)
MONOCYTES NFR BLD AUTO: 8 % — SIGNIFICANT CHANGE UP (ref 1.7–9.3)
NEUTROPHILS # BLD AUTO: 6.21 K/UL — SIGNIFICANT CHANGE UP (ref 1.4–6.5)
NEUTROPHILS NFR BLD AUTO: 74.3 % — SIGNIFICANT CHANGE UP (ref 42.2–75.2)
NRBC # BLD: 0 /100 WBCS — SIGNIFICANT CHANGE UP (ref 0–0)
PLATELET # BLD AUTO: 310 K/UL — SIGNIFICANT CHANGE UP (ref 130–400)
POTASSIUM SERPL-MCNC: 4.1 MMOL/L — SIGNIFICANT CHANGE UP (ref 3.5–5)
POTASSIUM SERPL-SCNC: 4.1 MMOL/L — SIGNIFICANT CHANGE UP (ref 3.5–5)
PROT SERPL-MCNC: 5 G/DL — LOW (ref 6–8)
RBC # BLD: 3.94 M/UL — LOW (ref 4.2–5.4)
RBC # FLD: 13.7 % — SIGNIFICANT CHANGE UP (ref 11.5–14.5)
SODIUM SERPL-SCNC: 137 MMOL/L — SIGNIFICANT CHANGE UP (ref 135–146)
WBC # BLD: 8.37 K/UL — SIGNIFICANT CHANGE UP (ref 4.8–10.8)
WBC # FLD AUTO: 8.37 K/UL — SIGNIFICANT CHANGE UP (ref 4.8–10.8)

## 2018-10-23 RX ADMIN — Medication 81 MILLIGRAM(S): at 11:18

## 2018-10-23 RX ADMIN — HEPARIN SODIUM 5000 UNIT(S): 5000 INJECTION INTRAVENOUS; SUBCUTANEOUS at 06:30

## 2018-10-23 RX ADMIN — PANTOPRAZOLE SODIUM 40 MILLIGRAM(S): 20 TABLET, DELAYED RELEASE ORAL at 06:30

## 2018-10-23 RX ADMIN — Medication 40 MILLIGRAM(S): at 06:30

## 2018-10-23 RX ADMIN — HEPARIN SODIUM 5000 UNIT(S): 5000 INJECTION INTRAVENOUS; SUBCUTANEOUS at 13:21

## 2018-10-23 RX ADMIN — Medication 100 MILLIGRAM(S): at 06:30

## 2018-10-23 RX ADMIN — Medication 100 MILLIGRAM(S): at 11:18

## 2018-10-23 RX ADMIN — POLYETHYLENE GLYCOL 3350 17 GRAM(S): 17 POWDER, FOR SOLUTION ORAL at 18:23

## 2018-10-23 RX ADMIN — LISINOPRIL 40 MILLIGRAM(S): 2.5 TABLET ORAL at 06:30

## 2018-10-23 RX ADMIN — POLYETHYLENE GLYCOL 3350 17 GRAM(S): 17 POWDER, FOR SOLUTION ORAL at 06:31

## 2018-10-23 RX ADMIN — Medication 50 MILLIGRAM(S): at 06:30

## 2018-10-23 RX ADMIN — ATORVASTATIN CALCIUM 40 MILLIGRAM(S): 80 TABLET, FILM COATED ORAL at 21:47

## 2018-10-23 RX ADMIN — HEPARIN SODIUM 5000 UNIT(S): 5000 INJECTION INTRAVENOUS; SUBCUTANEOUS at 21:47

## 2018-10-23 RX ADMIN — Medication 100 MILLIGRAM(S): at 18:24

## 2018-10-23 NOTE — SWALLOW BEDSIDE ASSESSMENT ADULT - SWALLOW EVAL: DIAGNOSIS
mild-mod oral dysphagia for soft, puree, and nectar-thick liquids; +overt s/s aspiration/penetration for nectar-thick liq via straw

## 2018-10-23 NOTE — SWALLOW BEDSIDE ASSESSMENT ADULT - SWALLOW EVAL: RECOMMENDED DIET
Puree diet w/ nectar thick liquids
dysphagia 2 mech soft w/ nectar-thick liquids
dysphagia 3 diet: soft w/ nectar
dysphagia 3 diet: soft w/ nectar-thick liquids
Dysphagia 2 w/ honey thick liquids
dysphagia 3 soft w/ nectar-thick liquids

## 2018-10-23 NOTE — PROGRESS NOTE ADULT - ASSESSMENT
89 yoF with worsening autoimmune encephalitis on solumedrol   -CT/MRI/LP/EEG/CTA showing no clear etiology  -waiting on IgA level as per neuro, if not low will start IVIG  -switched to prednisone 40 from solumedrol    # HLD  -atorvastatin    # HTN  -lisinopril and metoprolol    # Dysphagia  -seen by speech and swallow, changed diet to dysphagia 2 with honey thick liquids / supervised meals with no straws    DVT GI PPX

## 2018-10-23 NOTE — SWALLOW BEDSIDE ASSESSMENT ADULT - SWALLOW EVAL: RECOMMENDED FEEDING/EATING TECHNIQUES
allow for swallow between intakes/allow time for delayed swallow/small sips/bites
allow for swallow between intakes/check mouth frequently for oral residue/pocketing/oral hygiene/position upright (90 degrees)/small sips/bites
pt prefers straw/small sips/bites/check mouth frequently for oral residue/pocketing/position upright (90 degrees)
small sips/bites/allow for swallow between intakes/no straws/check mouth frequently for oral residue/pocketing/position upright (90 degrees)
position upright (90 degrees)/check mouth frequently for oral residue/pocketing/small sips/bites
position upright (90 degrees)/small sips/bites

## 2018-10-23 NOTE — PROGRESS NOTE ADULT - SUBJECTIVE AND OBJECTIVE BOX
SUBJECTIVE:    Patient is a 89y old Female who presents with a chief complaint of altered mental status (22 Oct 2018 17:05)      HPI:  (((I went to see the patient this night 4:45 am, patient is barely responsive, everything written here is the information I got from the old chart from the [facility].)))(Talked with the daughter also regarding this.)    88 Y/O  with PMH of HTN, Hyperlipidemia went to the ED of [facility] for constipation. Manual disimpaction was done and patient was sent home. Next day patient's personality started changing, Became angry on , a bit combative, went to pmd where ct head was ordered. The Ct showed concern for a brain mass. She was readmitted to [facility] for further workup. Repeat CT head: decreased density in right frontal white matter which is likely related to chronic ischemic change. CT angiogram was unremarkable  MRI of the brain showed multifocal subcortical signal abnormalities with suggestion of cortical involvement. Findings are atypical for small vessel disease. Inflammatory etiology should be suspected. No mass.   urinalysis was done. Mildly positive for uti. She was started on levofloxacin but it was stopped due to a negative urine culture.   Diagnosis of metabolic encephalopathy prob 2/2 to uti was made. slurred speech resolved. (possible tia) , hypokalemia was corrected. LP was done but result has not been mentioned in papers. ECHO showed EF 75-80%, mod AS, mild AR, TR. EEG showed generalized slowing   As per daughter before this episode patient was AAOx3 and they were not satisfied with the workup in [facility] so she was transferred to [facility]. (14 Oct 2018 04:53)      Currently admitted to medicine with the primary diagnosis of      Besides the pertinent positives and negatives described above, the ROS was within normal limits.    PAST MEDICAL & SURGICAL HISTORY  Hyperlipidemia  HTN (hypertension)  No significant past surgical history    SOCIAL HISTORY:    ALLERGIES:  No Known Allergies    MEDICATIONS:  STANDING MEDICATIONS  aspirin  chewable 81 milliGRAM(s) Oral daily  atorvastatin 40 milliGRAM(s) Oral at bedtime  docusate sodium 100 milliGRAM(s) Oral two times a day  heparin  Injectable 5000 Unit(s) SubCutaneous every 8 hours  lisinopril 40 milliGRAM(s) Oral daily  metoprolol succinate ER 50 milliGRAM(s) Oral daily  pantoprazole    Tablet 40 milliGRAM(s) Oral before breakfast  polyethylene glycol 3350 17 Gram(s) Oral every 12 hours  predniSONE   Tablet 40 milliGRAM(s) Oral daily  thiamine 100 milliGRAM(s) Oral daily    PRN MEDICATIONS  acetaminophen   Tablet .. 650 milliGRAM(s) Oral every 6 hours PRN    VITALS:   T(F): 97.9  HR: 63  BP: 153/69  RR: 20  SpO2: --    LABS:                        11.1   8.37  )-----------( 310      ( 23 Oct 2018 06:02 )             33.2     10-23    137  |  102  |  20  ----------------------------<  109<H>  4.1   |  25  |  0.5<L>    Ca    8.5      23 Oct 2018 06:02  Mg     2.3     10-23    TPro  5.0<L>  /  Alb  2.7<L>  /  TBili  <0.2  /  DBili  x   /  AST  15  /  ALT  22  /  AlkPhos  59  10-23                  RADIOLOGY:    PHYSICAL EXAM:  GEN: No acute distress  LUNGS: Clear to auscultation bilaterally   HEART: Regular  ABD: Soft, non-tender, non-distended.  EXT: NC/NC/NE/2+PP/TEJEDA/Skin Intact.   NEURO: AAOX1    Intravenous access:   NG tube:   Olivas Catheter:

## 2018-10-23 NOTE — SWALLOW BEDSIDE ASSESSMENT ADULT - ORAL PHASE
Delayed oral transit time Decreased anterior-posterior movement of the bolus/Lingual stasis/Delayed oral transit time/impaired rotary chew

## 2018-10-23 NOTE — SWALLOW BEDSIDE ASSESSMENT ADULT - SLP GENERAL OBSERVATIONS
pt received OOB to chair awake w/o c/o pain. +room air. +confusion.
Pt received in bed awake, responsive, aphasic w/ cognitive deficits suspected
pt received OOB to chair awake more alert today w/o c/o pain. +room air.
pt received in bed asleep +arousable w/o c/o pain. +room air. family at b/s. +white coating noted on lingual surface, RN notified- oral care provided.
Pt received OOB to chair awake yet lethargic, decreased attn left
pt received in bed asleep +minimally arousable at this time. pt in no apparent pain. family at b/s.

## 2018-10-23 NOTE — PROGRESS NOTE ADULT - SUBJECTIVE AND OBJECTIVE BOX
SUBJECTIVE:  Patient is a 89y old Female who presented with a chief complaint of altered mental status (18 Oct 2018 14:51)  Currently admitted to medicine with the primary diagnosis of Altered mental status     INTERVAL HISTORY;  Today is hospital day 8. This morning she is resting comfortably in bed and reports no new issues or overnight events.     PAST MEDICAL & SURGICAL HISTORY  Hyperlipidemia  HTN (hypertension)  No significant past surgical history    SOCIAL HISTORY:  Negative for smoking/alcohol/drug use.     ALLERGIES:  No Known Allergies          PHYSICAL EXAM:  GEN: No acute distress  LUNGS: Clear to auscultation bilaterally   HEART: S1/S2 present. RRR.   ABD: Soft, non-tender, non-distended. Bowel sounds present  NEURO: TEJEDA R>L, refuses to talk today        ROS: Denies CP, SOB, AP    MEDICATIONS  (STANDING):  aspirin  chewable 81 milliGRAM(s) Oral daily  atorvastatin 40 milliGRAM(s) Oral at bedtime  docusate sodium 100 milliGRAM(s) Oral two times a day  heparin  Injectable 5000 Unit(s) SubCutaneous every 8 hours  lisinopril 40 milliGRAM(s) Oral daily  metoprolol succinate ER 50 milliGRAM(s) Oral daily  pantoprazole    Tablet 40 milliGRAM(s) Oral before breakfast  polyethylene glycol 3350 17 Gram(s) Oral every 12 hours  predniSONE   Tablet 40 milliGRAM(s) Oral daily  thiamine 100 milliGRAM(s) Oral daily    MEDICATIONS  (PRN):  acetaminophen   Tablet .. 650 milliGRAM(s) Oral every 6 hours PRN Moderate Pain (4 - 6)    Home Medications:  aspirin 81 mg oral tablet: 1 tab(s) orally once a day (14 Oct 2018 06:09)  atorvastatin 40 mg oral tablet: 1 tab(s) orally once a day (at bedtime) (14 Oct 2018 06:09)  docusate sodium 100 mg oral tablet: 1 tab(s) orally 2 times a day (14 Oct 2018 06:09)  metoprolol succinate 50 mg oral tablet, extended release: 1 tab(s) orally once a day (14 Oct 2018 06:09)  ramipril 10 mg oral tablet: 1 tab(s) orally 2 times a day (14 Oct 2018 06:09)    Vital Signs Last 24 Hrs  T(C): 36.2 (23 Oct 2018 15:25), Max: 37.1 (22 Oct 2018 23:48)  T(F): 97.2 (23 Oct 2018 15:25), Max: 98.7 (22 Oct 2018 23:48)  HR: 74 (23 Oct 2018 15:25) (63 - 74)  BP: 133/63 (23 Oct 2018 15:25) (133/63 - 187/80)  BP(mean): --  RR: 18 (23 Oct 2018 15:25) (18 - 20)  SpO2: --  CAPILLARY BLOOD GLUCOSE      POCT Blood Glucose.: 132 mg/dL (23 Oct 2018 11:24)  POCT Blood Glucose.: 117 mg/dL (23 Oct 2018 05:53)  POCT Blood Glucose.: 141 mg/dL (22 Oct 2018 21:47)    LABS:                        11.1   8.37  )-----------( 310      ( 23 Oct 2018 06:02 )             33.2     10-23    137  |  102  |  20  ----------------------------<  109<H>  4.1   |  25  |  0.5<L>    Ca    8.5      23 Oct 2018 06:02  Mg     2.3     10-23    TPro  5.0<L>  /  Alb  2.7<L>  /  TBili  <0.2  /  DBili  x   /  AST  15  /  ALT  22  /  AlkPhos  59  10-23    LIVER FUNCTIONS - ( 23 Oct 2018 06:02 )  Alb: 2.7 g/dL / Pro: 5.0 g/dL / ALK PHOS: 59 U/L / ALT: 22 U/L / AST: 15 U/L / GGT: x                           Consultant(s) Notes Reviewed:  [x ] YES  [ ] NO  Care Discussed with Consultants/Other Providers/ Housestaff [ x] YES  [ ] NO  Radiology personally reviewed.

## 2018-10-23 NOTE — PROGRESS NOTE ADULT - ASSESSMENT
#AMS 2/2  likely Aggressive degenerative/inflammatory dz  -ct scan, ct angio and mri negative for mass - ALL DONE AT [facility]  -urinalysis is negative for acute uti 10/14  -blood culture - negative 10/14  -ESR 61, CRP 6.68 - 10/14  -FTA-ABS is negative  -Serum Na 129 10/14  -neurology consult for ams - recs - paraneoplastic workup, exclude GI malignancy, LP results from [facility] are negative for any bacterial fungal growth.   -MRI brain e/eout cont 10/16 -  shows extensive white mater signals abnormality in BL frontal lobe and right parietal lobe indicating infectious/inflammatory encephalitis  -EEG - 10/15 - no seizure activity  - Neurology recs to start on steroids 10/17   -CTA 10/18 showed no signs of vasculitis.  -N-Thyroglobulin Ab <20.0 10/18  -Vitamin B12 981, folate 9.0, ammonia 20 10/16  -Anti SSA, anti SSA <0.2, RF <10, thyroperoxidase Ab <10, DsDNA <12, ANDRIY negative, Lyme negative 10/18  -LP results and protein 14-3-3 results from [facility] unremarkable  -completed Solumedrol 1gm Q24 course  -on Prednisone 40mg   -will check IgA and start IV Ig as long as IgA not low    #DLD  -c/w atorvastatin    #constipation  -On Miralax, colace and lactulose    #HTN  -controlled. c/w lisinopril and metoprolol    #DVT ppx  -hep sub q      Poor Px.  d/w neuro, family in details

## 2018-10-23 NOTE — SWALLOW BEDSIDE ASSESSMENT ADULT - SWALLOW EVAL: FEEDING ASSISTANCE
dependent
dependent
frequent cues/help required/1:1 feed
frequent cues/help required/1:1 feed
1:1 feed/dependent
dependent

## 2018-10-23 NOTE — SWALLOW BEDSIDE ASSESSMENT ADULT - NS ASR SWALLOW FINDINGS DISCUS
MD Pradhan/Physician/Patient
CHRISTO Rios/Nursing
Nursing/Physician/Patient
Physician/Nursing/Patient/Family/MD Cynthia Hampton
Patient/Physician/Nursing/Dr. Pradhan
Physician/Nursing/MD Marie RN Saira/Family

## 2018-10-24 LAB
ALBUMIN SERPL ELPH-MCNC: 3.1 G/DL — LOW (ref 3.5–5.2)
ALP SERPL-CCNC: 62 U/L — SIGNIFICANT CHANGE UP (ref 30–115)
ALT FLD-CCNC: 25 U/L — SIGNIFICANT CHANGE UP (ref 0–41)
ANION GAP SERPL CALC-SCNC: 13 MMOL/L — SIGNIFICANT CHANGE UP (ref 7–14)
AST SERPL-CCNC: 21 U/L — SIGNIFICANT CHANGE UP (ref 0–41)
BASOPHILS # BLD AUTO: 0.04 K/UL — SIGNIFICANT CHANGE UP (ref 0–0.2)
BASOPHILS NFR BLD AUTO: 0.5 % — SIGNIFICANT CHANGE UP (ref 0–1)
BILIRUB SERPL-MCNC: 0.2 MG/DL — SIGNIFICANT CHANGE UP (ref 0.2–1.2)
BUN SERPL-MCNC: 15 MG/DL — SIGNIFICANT CHANGE UP (ref 10–20)
CALCIUM SERPL-MCNC: 8.4 MG/DL — LOW (ref 8.5–10.1)
CHLORIDE SERPL-SCNC: 99 MMOL/L — SIGNIFICANT CHANGE UP (ref 98–110)
CO2 SERPL-SCNC: 24 MMOL/L — SIGNIFICANT CHANGE UP (ref 17–32)
CREAT SERPL-MCNC: <0.5 MG/DL — LOW (ref 0.7–1.5)
EOSINOPHIL # BLD AUTO: 0.01 K/UL — SIGNIFICANT CHANGE UP (ref 0–0.7)
EOSINOPHIL NFR BLD AUTO: 0.1 % — SIGNIFICANT CHANGE UP (ref 0–8)
GLUCOSE BLDC GLUCOMTR-MCNC: 111 MG/DL — HIGH (ref 70–99)
GLUCOSE SERPL-MCNC: 96 MG/DL — SIGNIFICANT CHANGE UP (ref 70–99)
HCT VFR BLD CALC: 38.3 % — SIGNIFICANT CHANGE UP (ref 37–47)
HGB BLD-MCNC: 12.9 G/DL — SIGNIFICANT CHANGE UP (ref 12–16)
IGA FLD-MCNC: 114 MG/DL — SIGNIFICANT CHANGE UP (ref 84–499)
IGA FLD-MCNC: 122 MG/DL — SIGNIFICANT CHANGE UP (ref 84–499)
IGG FLD-MCNC: 664 MG/DL — SIGNIFICANT CHANGE UP (ref 610–1660)
IGM SERPL-MCNC: 77 MG/DL — SIGNIFICANT CHANGE UP (ref 35–242)
IMM GRANULOCYTES NFR BLD AUTO: 4.8 % — HIGH (ref 0.1–0.3)
KAPPA LC SER QL IFE: 0.75 MG/DL — SIGNIFICANT CHANGE UP (ref 0.33–1.94)
KAPPA/LAMBDA FREE LIGHT CHAIN RATIO, SERUM: 0.72 RATIO — SIGNIFICANT CHANGE UP (ref 0.26–1.65)
LAMBDA LC SER QL IFE: 1.04 MG/DL — SIGNIFICANT CHANGE UP (ref 0.57–2.63)
LYMPHOCYTES # BLD AUTO: 0.82 K/UL — LOW (ref 1.2–3.4)
LYMPHOCYTES # BLD AUTO: 10.1 % — LOW (ref 20.5–51.1)
MAGNESIUM SERPL-MCNC: 2.4 MG/DL — SIGNIFICANT CHANGE UP (ref 1.8–2.4)
MCHC RBC-ENTMCNC: 28.9 PG — SIGNIFICANT CHANGE UP (ref 27–31)
MCHC RBC-ENTMCNC: 33.7 G/DL — SIGNIFICANT CHANGE UP (ref 32–37)
MCV RBC AUTO: 85.7 FL — SIGNIFICANT CHANGE UP (ref 81–99)
MONOCYTES # BLD AUTO: 0.55 K/UL — SIGNIFICANT CHANGE UP (ref 0.1–0.6)
MONOCYTES NFR BLD AUTO: 6.8 % — SIGNIFICANT CHANGE UP (ref 1.7–9.3)
NEUTROPHILS # BLD AUTO: 6.29 K/UL — SIGNIFICANT CHANGE UP (ref 1.4–6.5)
NEUTROPHILS NFR BLD AUTO: 77.7 % — HIGH (ref 42.2–75.2)
NRBC # BLD: 0 /100 WBCS — SIGNIFICANT CHANGE UP (ref 0–0)
PLATELET # BLD AUTO: 277 K/UL — SIGNIFICANT CHANGE UP (ref 130–400)
POTASSIUM SERPL-MCNC: 4.2 MMOL/L — SIGNIFICANT CHANGE UP (ref 3.5–5)
POTASSIUM SERPL-SCNC: 4.2 MMOL/L — SIGNIFICANT CHANGE UP (ref 3.5–5)
PROT SERPL-MCNC: 5.4 G/DL — LOW (ref 6–8)
RBC # BLD: 4.47 M/UL — SIGNIFICANT CHANGE UP (ref 4.2–5.4)
RBC # FLD: 14.1 % — SIGNIFICANT CHANGE UP (ref 11.5–14.5)
SODIUM SERPL-SCNC: 136 MMOL/L — SIGNIFICANT CHANGE UP (ref 135–146)
WBC # BLD: 8.1 K/UL — SIGNIFICANT CHANGE UP (ref 4.8–10.8)
WBC # FLD AUTO: 8.1 K/UL — SIGNIFICANT CHANGE UP (ref 4.8–10.8)

## 2018-10-24 RX ADMIN — HEPARIN SODIUM 5000 UNIT(S): 5000 INJECTION INTRAVENOUS; SUBCUTANEOUS at 05:40

## 2018-10-24 RX ADMIN — ATORVASTATIN CALCIUM 40 MILLIGRAM(S): 80 TABLET, FILM COATED ORAL at 21:17

## 2018-10-24 RX ADMIN — HEPARIN SODIUM 5000 UNIT(S): 5000 INJECTION INTRAVENOUS; SUBCUTANEOUS at 13:29

## 2018-10-24 RX ADMIN — Medication 100 MILLIGRAM(S): at 05:41

## 2018-10-24 RX ADMIN — Medication 81 MILLIGRAM(S): at 11:46

## 2018-10-24 RX ADMIN — POLYETHYLENE GLYCOL 3350 17 GRAM(S): 17 POWDER, FOR SOLUTION ORAL at 05:41

## 2018-10-24 RX ADMIN — PANTOPRAZOLE SODIUM 40 MILLIGRAM(S): 20 TABLET, DELAYED RELEASE ORAL at 05:42

## 2018-10-24 RX ADMIN — LISINOPRIL 40 MILLIGRAM(S): 2.5 TABLET ORAL at 05:40

## 2018-10-24 RX ADMIN — POLYETHYLENE GLYCOL 3350 17 GRAM(S): 17 POWDER, FOR SOLUTION ORAL at 17:28

## 2018-10-24 RX ADMIN — Medication 40 MILLIGRAM(S): at 05:40

## 2018-10-24 RX ADMIN — HEPARIN SODIUM 5000 UNIT(S): 5000 INJECTION INTRAVENOUS; SUBCUTANEOUS at 21:17

## 2018-10-24 RX ADMIN — Medication 100 MILLIGRAM(S): at 17:29

## 2018-10-24 RX ADMIN — Medication 100 MILLIGRAM(S): at 11:46

## 2018-10-24 RX ADMIN — Medication 50 MILLIGRAM(S): at 05:40

## 2018-10-24 NOTE — PROGRESS NOTE ADULT - SUBJECTIVE AND OBJECTIVE BOX
88 Y/O  with PMH of HTN, Hyperlipidemia went to the ED of [facility] for constipation. Manual disimpaction was done and patient was sent home. Next day patient's personality started changing, Became angry on , a bit combative, went to pmd where ct head was ordered. The Ct showed concern for a brain mass. She was readmitted to [facility] for further workup. Repeat CT head: decreased density in right frontal white matter which is likely related to chronic ischemic change. CT angiogram was unremarkable  MRI of the brain showed multifocal subcortical signal abnormalities with suggestion of cortical involvement. Findings are atypical for small vessel disease. Inflammatory etiology should be suspected. No mass.   urinalysis was done. Mildly positive for uti. She was started on levofloxacin but it was stopped due to a negative urine culture.   Diagnosis of metabolic encephalopathy prob 2/2 to uti was made. slurred speech resolved. (possible tia) , hypokalemia was corrected. LP was done but result has not been mentioned in papers. ECHO showed EF 75-80%, mod AS, mild AR, TR. EEG showed generalized slowing.            PAST MEDICAL & SURGICAL HISTORY  Hyperlipidemia  HTN (hypertension)  No significant past surgical history    SOCIAL HISTORY:    ALLERGIES:  No Known Allergies    MEDICATIONS:  STANDING MEDICATIONS  aspirin  chewable 81 milliGRAM(s) Oral daily  atorvastatin 40 milliGRAM(s) Oral at bedtime  docusate sodium 100 milliGRAM(s) Oral two times a day  heparin  Injectable 5000 Unit(s) SubCutaneous every 8 hours  lisinopril 40 milliGRAM(s) Oral daily  metoprolol succinate ER 50 milliGRAM(s) Oral daily  pantoprazole    Tablet 40 milliGRAM(s) Oral before breakfast  polyethylene glycol 3350 17 Gram(s) Oral every 12 hours  predniSONE   Tablet 40 milliGRAM(s) Oral daily  thiamine 100 milliGRAM(s) Oral daily    PRN MEDICATIONS  acetaminophen   Tablet .. 650 milliGRAM(s) Oral every 6 hours PRN    VITALS:   T(F): 97.6  HR: 70  BP: 137/61  RR: 18  SpO2: 95%    LABS:                        12.9   8.10  )-----------( 277      ( 24 Oct 2018 07:36 )             38.3     10-24    136  |  99  |  15  ----------------------------<  96  4.2   |  24  |  <0.5<L>    Ca    8.4<L>      24 Oct 2018 07:36  Mg     2.4     10-24    TPro  5.4<L>  /  Alb  3.1<L>  /  TBili  0.2  /  DBili  x   /  AST  21  /  ALT  25  /  AlkPhos  62  10-24                  RADIOLOGY:    PHYSICAL EXAM:  GEN: No acute distress / lethargic  LUNGS: Clear to auscultation bilaterally   HEART: Regular  ABD: Soft, non-tender, non-distended.  EXT: NC/NC/NE/2+PP/TEJEDA/Skin Intact.   NEURO: AAOX1, minimally  responsive    Intravenous access:   NG tube:   Olivas Catheter: 89 Y/O  with PMH of HTN, Hyperlipidemia went to the ED of [facility] for constipation. Manual disimpaction was done and patient was sent home. Next day patient's personality started changing, Became angry on , a bit combative, went to pmd where ct head was ordered. The Ct showed concern for a brain mass. She was readmitted to [facility] for further workup. Repeat CT head: decreased density in right frontal white matter which is likely related to chronic ischemic change. CT angiogram was unremarkable  MRI of the brain showed multifocal subcortical signal abnormalities with suggestion of cortical involvement. Findings are atypical for small vessel disease. Inflammatory etiology should be suspected. No mass.   urinalysis was done. Mildly positive for uti. She was started on levofloxacin but it was stopped due to a negative urine culture.   Diagnosis of metabolic encephalopathy prob 2/2 to uti was made. slurred speech resolved. (possible tia) , hypokalemia was corrected. LP was done but result has not been mentioned in papers. ECHO showed EF 75-80%, mod AS, mild AR, TR. EEG showed generalized slowing.  Pt was consulted by neurology and diagnosed with autoimmune encephalitis. Her IgA level was WNL, IV Ig recommended by neurology.   Today I spoke with family at length case discussed .  Family refused any invasive procedures.    PAST MEDICAL & SURGICAL HISTORY  Hyperlipidemia  HTN (hypertension)  No significant past surgical history    SOCIAL HISTORY:    ALLERGIES:  No Known Allergies    MEDICATIONS:  aspirin  chewable 81 milliGRAM(s) Oral daily  atorvastatin 40 milliGRAM(s) Oral at bedtime  docusate sodium 100 milliGRAM(s) Oral two times a day  heparin  Injectable 5000 Unit(s) SubCutaneous every 8 hours  lisinopril 40 milliGRAM(s) Oral daily  metoprolol succinate ER 50 milliGRAM(s) Oral daily  pantoprazole    Tablet 40 milliGRAM(s) Oral before breakfast  polyethylene glycol 3350 17 Gram(s) Oral every 12 hours  predniSONE   Tablet 40 milliGRAM(s) Oral daily  thiamine 100 milliGRAM(s) Oral daily    MEDICATIONS  (PRN):  acetaminophen   Tablet .. 650 milliGRAM(s) Oral every 6 hours PRN Moderate Pain (4 - 6)      VITALS:   T(C): 36.7 (24 Oct 2018 16:12), Max: 36.7 (24 Oct 2018 16:12)  T(F): 98.1 (24 Oct 2018 16:12), Max: 98.1 (24 Oct 2018 16:12)  HR: 65 (24 Oct 2018 16:12) (60 - 75)  BP: 120/56 (24 Oct 2018 16:12) (120/56 - 180/111)  RR: 20 (24 Oct 2018 16:12) (17 - 20)  SpO2: 95% (24 Oct 2018 09:04) (95% - 95%)    LABS:                        12.9   8.10  )-----------( 277      ( 24 Oct 2018 07:36 )             38.3     10-24    136  |  99  |  15  ----------------------------<  96  4.2   |  24  |  <0.5<L>    Ca    8.4<L>      24 Oct 2018 07:36  Mg     2.4     10-24    TPro  5.4<L>  /  Alb  3.1<L>  /  TBili  0.2  /  DBili  x   /  AST  21  /  ALT  25  /  AlkPhos  62  10-24    RADIOLOGY:  < from: CT Angio Head w/ IV Cont (10.18.18 @ 09:52) >  Impression:    Unremarkable CT angiogram of the head. No vessel irregularity or stenosis   to suggest vasculitis.    < from: MR Head w/wo IV Cont (10.16.18 @ 17:01) >    IMPRESSION:     Extensive expansile nonenhancing white matter signal abnormality   involving the right greater than left frontal lobes and the right   parietal lobe which is progressed since the prior MRI of 10/3/2018.  Findings are nonspecific and suggestive of an extensive inflammatory or   infectious encephalitis.    MEDICATIONS  (STANDING):  aspirin  chewable 81 milliGRAM(s) Oral daily  atorvastatin 40 milliGRAM(s) Oral at bedtime  docusate sodium 100 milliGRAM(s) Oral two times a day  heparin  Injectable 5000 Unit(s) SubCutaneous every 8 hours  lisinopril 40 milliGRAM(s) Oral daily  metoprolol succinate ER 50 milliGRAM(s) Oral daily  pantoprazole    Tablet 40 milliGRAM(s) Oral before breakfast  polyethylene glycol 3350 17 Gram(s) Oral every 12 hours  predniSONE   Tablet 40 milliGRAM(s) Oral daily  thiamine 100 milliGRAM(s) Oral daily    MEDICATIONS  (PRN):  acetaminophen   Tablet .. 650 milliGRAM(s) Oral every 6 hours PRN Moderate Pain (4 - 6)          PHYSICAL EXAM:  GEN: No acute distress / lethargic  LUNGS: Clear to auscultation bilaterally   HEART: Regular  ABD: Soft, non-tender, non-distended.  EXT: NC/NC/NE/2+PP/TEJEDA/Skin Intact.   NEURO: AAOX1, minimally  responsive    Intravenous access:   NG tube:   Olivas Catheter:

## 2018-10-24 NOTE — PROGRESS NOTE ADULT - ASSESSMENT
89 yoF with worsening autoimmune encephalitis on solumedrol     Family does not want biopsy or any other invasive measures    -ESR 61  -CT/MRI/LP/EEG/CTA showing no clear etiology  -quantitative IgA 129 will f/u neuro for IVIG recs  -on prednisone 40 daily    # HLD  -atorvastatin    # HTN  -lisinopril and metoprolol    # Dysphagia  -dysphagia 2 with honey thick liquids / supervised meals with no straws    DVT GI PPX 89 yoF with worsening autoimmune encephalitis on solumedrol   88 Y/O  with PMH of HTN, Hyperlipidemia went to the ED of [facility] for constipation. Manual disimpaction was done and patient was sent home. Next day patient's personality started changing, Became angry on , a bit combative, went to pmd where ct head was ordered. The Ct showed concern for a brain mass. She was readmitted to [facility] for further workup. Repeat CT head: decreased density in right frontal white matter which is likely related to chronic ischemic change. CT angiogram was unremarkable  MRI of the brain showed multifocal subcortical signal abnormalities with suggestion of cortical involvement. Findings are atypical for small vessel disease. Inflammatory etiology should be suspected. No mass.   urinalysis was done. Mildly positive for uti. She was started on levofloxacin but it was stopped due to a negative urine culture.   Diagnosis of metabolic encephalopathy prob 2/2 to uti was made. slurred speech resolved. (possible tia) , hypokalemia was corrected. LP was done but result has not been mentioned in papers. ECHO showed EF 75-80%, mod AS, mild AR, TR. EEG showed generalized slowing.  Pt was consulted by neurology and diagnosed with autoimmune encephalitis. Her IgA level was WNL, IV Ig recommended by neurology.     # Autoimmune encephalitis  - consulted by neurology   -ESR 61  -CT/MRI/LP/EEG/CTA showing no clear etiology  -quantitative IgA 129 will f/u neuro for IVIG   -on prednisone 40 daily    # HLD  -atorvastatin    # HTN  -lisinopril and metoprolol    # Dysphagia  -dysphagia 2 with honey thick liquids / supervised meals with no straws    DVT GI PPX    Case d/w family at length

## 2018-10-24 NOTE — PROGRESS NOTE ADULT - NSHPATTENDINGPLANDISCUSS_GEN_ALL_CORE
Housestaff, nursing, social work, neuro, family
Housestaff, nursing, social work, neuro, family
house staff, medical team during rounds and family

## 2018-10-24 NOTE — PROGRESS NOTE ADULT - SUBJECTIVE AND OBJECTIVE BOX
SUBJECTIVE:    Patient is a 89y old Female who presents with a chief complaint of altered mental status (23 Oct 2018 16:52)      HPI:  (((I went to see the patient this night 4:45 am, patient is barely responsive, everything written here is the information I got from the old chart from the [facility].)))(Talked with the daughter also regarding this.)    88 Y/O  with PMH of HTN, Hyperlipidemia went to the ED of [facility] for constipation. Manual disimpaction was done and patient was sent home. Next day patient's personality started changing, Became angry on , a bit combative, went to pmd where ct head was ordered. The Ct showed concern for a brain mass. She was readmitted to [facility] for further workup. Repeat CT head: decreased density in right frontal white matter which is likely related to chronic ischemic change. CT angiogram was unremarkable  MRI of the brain showed multifocal subcortical signal abnormalities with suggestion of cortical involvement. Findings are atypical for small vessel disease. Inflammatory etiology should be suspected. No mass.   urinalysis was done. Mildly positive for uti. She was started on levofloxacin but it was stopped due to a negative urine culture.   Diagnosis of metabolic encephalopathy prob 2/2 to uti was made. slurred speech resolved. (possible tia) , hypokalemia was corrected. LP was done but result has not been mentioned in papers. ECHO showed EF 75-80%, mod AS, mild AR, TR. EEG showed generalized slowing   As per daughter before this episode patient was AAOx3 and they were not satisfied with the workup in [facility] so she was transferred to [facility]. (14 Oct 2018 04:53)      Currently admitted to medicine with the primary diagnosis of      Besides the pertinent positives and negatives described above, the ROS was within normal limits.    PAST MEDICAL & SURGICAL HISTORY  Hyperlipidemia  HTN (hypertension)  No significant past surgical history    SOCIAL HISTORY:    ALLERGIES:  No Known Allergies    MEDICATIONS:  STANDING MEDICATIONS  aspirin  chewable 81 milliGRAM(s) Oral daily  atorvastatin 40 milliGRAM(s) Oral at bedtime  docusate sodium 100 milliGRAM(s) Oral two times a day  heparin  Injectable 5000 Unit(s) SubCutaneous every 8 hours  lisinopril 40 milliGRAM(s) Oral daily  metoprolol succinate ER 50 milliGRAM(s) Oral daily  pantoprazole    Tablet 40 milliGRAM(s) Oral before breakfast  polyethylene glycol 3350 17 Gram(s) Oral every 12 hours  predniSONE   Tablet 40 milliGRAM(s) Oral daily  thiamine 100 milliGRAM(s) Oral daily    PRN MEDICATIONS  acetaminophen   Tablet .. 650 milliGRAM(s) Oral every 6 hours PRN    VITALS:   T(F): 97.6  HR: 70  BP: 137/61  RR: 18  SpO2: 95%    LABS:                        12.9   8.10  )-----------( 277      ( 24 Oct 2018 07:36 )             38.3     10-24    136  |  99  |  15  ----------------------------<  96  4.2   |  24  |  <0.5<L>    Ca    8.4<L>      24 Oct 2018 07:36  Mg     2.4     10-24    TPro  5.4<L>  /  Alb  3.1<L>  /  TBili  0.2  /  DBili  x   /  AST  21  /  ALT  25  /  AlkPhos  62  10-24                  RADIOLOGY:    PHYSICAL EXAM:  GEN: No acute distress / lethargic  LUNGS: Clear to auscultation bilaterally   HEART: Regular  ABD: Soft, non-tender, non-distended.  EXT: NC/NC/NE/2+PP/TEJEDA/Skin Intact.   NEURO: AAOX1, minimally  responsive    Intravenous access:   NG tube:   Olivas Catheter:

## 2018-10-24 NOTE — PROGRESS NOTE ADULT - ASSESSMENT
89 yoF with worsening autoimmune encephalitis on solumedrol   -ESR 61  -CT/MRI/LP/EEG/CTA showing no clear etiology  -quantitative IgA 129 will f/u neuro for IVIG recs  -on prednisone 40 daily    # HLD  -atorvastatin    # HTN  -lisinopril and metoprolol    # Dysphagia  -dysphagia 2 with honey thick liquids / supervised meals with no straws    DVT GI PPX 89 yoF with worsening autoimmune encephalitis on solumedrol     Family does not want biopsy or any other invasive measures    -ESR 61  -CT/MRI/LP/EEG/CTA showing no clear etiology  -quantitative IgA 129 will f/u neuro for IVIG recs  -on prednisone 40 daily    # HLD  -atorvastatin    # HTN  -lisinopril and metoprolol    # Dysphagia  -dysphagia 2 with honey thick liquids / supervised meals with no straws    DVT GI PPX

## 2018-10-25 LAB
ALBUMIN SERPL ELPH-MCNC: 3 G/DL — LOW (ref 3.5–5.2)
ALP SERPL-CCNC: 61 U/L — SIGNIFICANT CHANGE UP (ref 30–115)
ALT FLD-CCNC: 23 U/L — SIGNIFICANT CHANGE UP (ref 0–41)
ANION GAP SERPL CALC-SCNC: 12 MMOL/L — SIGNIFICANT CHANGE UP (ref 7–14)
AST SERPL-CCNC: 18 U/L — SIGNIFICANT CHANGE UP (ref 0–41)
BASOPHILS # BLD AUTO: 0.03 K/UL — SIGNIFICANT CHANGE UP (ref 0–0.2)
BASOPHILS NFR BLD AUTO: 0.4 % — SIGNIFICANT CHANGE UP (ref 0–1)
BILIRUB SERPL-MCNC: 0.3 MG/DL — SIGNIFICANT CHANGE UP (ref 0.2–1.2)
BUN SERPL-MCNC: 15 MG/DL — SIGNIFICANT CHANGE UP (ref 10–20)
CALCIUM SERPL-MCNC: 8.6 MG/DL — SIGNIFICANT CHANGE UP (ref 8.5–10.1)
CHLORIDE SERPL-SCNC: 100 MMOL/L — SIGNIFICANT CHANGE UP (ref 98–110)
CO2 SERPL-SCNC: 23 MMOL/L — SIGNIFICANT CHANGE UP (ref 17–32)
CREAT SERPL-MCNC: 0.5 MG/DL — LOW (ref 0.7–1.5)
EOSINOPHIL # BLD AUTO: 0.01 K/UL — SIGNIFICANT CHANGE UP (ref 0–0.7)
EOSINOPHIL NFR BLD AUTO: 0.1 % — SIGNIFICANT CHANGE UP (ref 0–8)
GLUCOSE SERPL-MCNC: 107 MG/DL — HIGH (ref 70–99)
HCT VFR BLD CALC: 37.6 % — SIGNIFICANT CHANGE UP (ref 37–47)
HGB BLD-MCNC: 12.7 G/DL — SIGNIFICANT CHANGE UP (ref 12–16)
IMM GRANULOCYTES NFR BLD AUTO: 4.4 % — HIGH (ref 0.1–0.3)
LYMPHOCYTES # BLD AUTO: 1.09 K/UL — LOW (ref 1.2–3.4)
LYMPHOCYTES # BLD AUTO: 14.1 % — LOW (ref 20.5–51.1)
MAGNESIUM SERPL-MCNC: 2.4 MG/DL — SIGNIFICANT CHANGE UP (ref 1.8–2.4)
MCHC RBC-ENTMCNC: 28.8 PG — SIGNIFICANT CHANGE UP (ref 27–31)
MCHC RBC-ENTMCNC: 33.8 G/DL — SIGNIFICANT CHANGE UP (ref 32–37)
MCV RBC AUTO: 85.3 FL — SIGNIFICANT CHANGE UP (ref 81–99)
MONOCYTES # BLD AUTO: 0.68 K/UL — HIGH (ref 0.1–0.6)
MONOCYTES NFR BLD AUTO: 8.8 % — SIGNIFICANT CHANGE UP (ref 1.7–9.3)
NEUTROPHILS # BLD AUTO: 5.57 K/UL — SIGNIFICANT CHANGE UP (ref 1.4–6.5)
NEUTROPHILS NFR BLD AUTO: 72.2 % — SIGNIFICANT CHANGE UP (ref 42.2–75.2)
NRBC # BLD: 0 /100 WBCS — SIGNIFICANT CHANGE UP (ref 0–0)
PLATELET # BLD AUTO: 292 K/UL — SIGNIFICANT CHANGE UP (ref 130–400)
POTASSIUM SERPL-MCNC: 4.3 MMOL/L — SIGNIFICANT CHANGE UP (ref 3.5–5)
POTASSIUM SERPL-SCNC: 4.3 MMOL/L — SIGNIFICANT CHANGE UP (ref 3.5–5)
PROT SERPL-MCNC: 5.2 G/DL — LOW (ref 6–8)
RBC # BLD: 4.41 M/UL — SIGNIFICANT CHANGE UP (ref 4.2–5.4)
RBC # FLD: 14.4 % — SIGNIFICANT CHANGE UP (ref 11.5–14.5)
SODIUM SERPL-SCNC: 135 MMOL/L — SIGNIFICANT CHANGE UP (ref 135–146)
WBC # BLD: 7.72 K/UL — SIGNIFICANT CHANGE UP (ref 4.8–10.8)
WBC # FLD AUTO: 7.72 K/UL — SIGNIFICANT CHANGE UP (ref 4.8–10.8)

## 2018-10-25 RX ORDER — ACETAMINOPHEN 500 MG
650 TABLET ORAL ONCE
Qty: 0 | Refills: 0 | Status: COMPLETED | OUTPATIENT
Start: 2018-10-25 | End: 2018-10-25

## 2018-10-25 RX ORDER — IMMUNE GLOBULIN (HUMAN) 10 G/100ML
20 INJECTION INTRAVENOUS; SUBCUTANEOUS EVERY 24 HOURS
Qty: 0 | Refills: 0 | Status: COMPLETED | OUTPATIENT
Start: 2018-10-25 | End: 2018-10-29

## 2018-10-25 RX ORDER — IMMUNE GLOBULIN (HUMAN) 10 G/100ML
0.4 INJECTION INTRAVENOUS; SUBCUTANEOUS EVERY 24 HOURS
Qty: 0 | Refills: 0 | Status: DISCONTINUED | OUTPATIENT
Start: 2018-10-25 | End: 2018-10-25

## 2018-10-25 RX ORDER — IMMUNE GLOBULIN (HUMAN) 10 G/100ML
19.8 INJECTION INTRAVENOUS; SUBCUTANEOUS EVERY 24 HOURS
Qty: 0 | Refills: 0 | Status: DISCONTINUED | OUTPATIENT
Start: 2018-10-25 | End: 2018-10-25

## 2018-10-25 RX ADMIN — Medication 50 MILLIGRAM(S): at 05:28

## 2018-10-25 RX ADMIN — POLYETHYLENE GLYCOL 3350 17 GRAM(S): 17 POWDER, FOR SOLUTION ORAL at 17:18

## 2018-10-25 RX ADMIN — HEPARIN SODIUM 5000 UNIT(S): 5000 INJECTION INTRAVENOUS; SUBCUTANEOUS at 05:28

## 2018-10-25 RX ADMIN — Medication 81 MILLIGRAM(S): at 11:51

## 2018-10-25 RX ADMIN — HEPARIN SODIUM 5000 UNIT(S): 5000 INJECTION INTRAVENOUS; SUBCUTANEOUS at 11:51

## 2018-10-25 RX ADMIN — Medication 100 MILLIGRAM(S): at 05:29

## 2018-10-25 RX ADMIN — Medication 325 MILLIGRAM(S): at 15:53

## 2018-10-25 RX ADMIN — PANTOPRAZOLE SODIUM 40 MILLIGRAM(S): 20 TABLET, DELAYED RELEASE ORAL at 06:02

## 2018-10-25 RX ADMIN — Medication 40 MILLIGRAM(S): at 05:28

## 2018-10-25 RX ADMIN — Medication 650 MILLIGRAM(S): at 17:18

## 2018-10-25 RX ADMIN — POLYETHYLENE GLYCOL 3350 17 GRAM(S): 17 POWDER, FOR SOLUTION ORAL at 05:29

## 2018-10-25 RX ADMIN — Medication 100 MILLIGRAM(S): at 11:51

## 2018-10-25 RX ADMIN — LISINOPRIL 40 MILLIGRAM(S): 2.5 TABLET ORAL at 05:29

## 2018-10-25 RX ADMIN — Medication 100 MILLIGRAM(S): at 17:18

## 2018-10-25 RX ADMIN — IMMUNE GLOBULIN (HUMAN) 33.33 GRAM(S): 10 INJECTION INTRAVENOUS; SUBCUTANEOUS at 16:06

## 2018-10-25 RX ADMIN — HEPARIN SODIUM 5000 UNIT(S): 5000 INJECTION INTRAVENOUS; SUBCUTANEOUS at 21:51

## 2018-10-25 NOTE — PROGRESS NOTE ADULT - ASSESSMENT
89 yoF with worsening autoimmune encephalitis on prednisone    Family does not want biopsy or any other invasive measures    -ESR 61  -CT/MRI/LP/EEG/CTA showing no clear etiology  -started IVIG today, 0.4 mg/kg, 20 mg over 6 hours for 5 days, premedicating with tylenol  -on prednisone 40 daily    # HLD  -atorvastatin    # HTN  -lisinopril and metoprolol    # Dysphagia  -dysphagia 2 with honey thick liquids / supervised meals with no straws    Discussed treatment with family    DVT GI PPX

## 2018-10-25 NOTE — PROGRESS NOTE ADULT - SUBJECTIVE AND OBJECTIVE BOX
SUBJECTIVE:    Patient is a 89y old Female who presents with a chief complaint of altered mental status (24 Oct 2018 17:34)      HPI:  (((I went to see the patient this night 4:45 am, patient is barely responsive, everything written here is the information I got from the old chart from the [facility].)))(Talked with the daughter also regarding this.)    89 Y/O  with PMH of HTN, Hyperlipidemia went to the ED of [facility] for constipation. Manual disimpaction was done and patient was sent home. Next day patient's personality started changing, Became angry on , a bit combative, went to pmd where ct head was ordered. The Ct showed concern for a brain mass. She was readmitted to [facility] for further workup. Repeat CT head: decreased density in right frontal white matter which is likely related to chronic ischemic change. CT angiogram was unremarkable  MRI of the brain showed multifocal subcortical signal abnormalities with suggestion of cortical involvement. Findings are atypical for small vessel disease. Inflammatory etiology should be suspected. No mass.   urinalysis was done. Mildly positive for uti. She was started on levofloxacin but it was stopped due to a negative urine culture.   Diagnosis of metabolic encephalopathy prob 2/2 to uti was made. slurred speech resolved. (possible tia) , hypokalemia was corrected. LP was done but result has not been mentioned in papers. ECHO showed EF 75-80%, mod AS, mild AR, TR. EEG showed generalized slowing   As per daughter before this episode patient was AAOx3 and they were not satisfied with the workup in [facility] so she was transferred to [facility]. (14 Oct 2018 04:53)      Currently admitted to medicine with the primary diagnosis of      Besides the pertinent positives and negatives described above, the ROS was within normal limits.    PAST MEDICAL & SURGICAL HISTORY  Hyperlipidemia  HTN (hypertension)  No significant past surgical history    SOCIAL HISTORY:    ALLERGIES:  No Known Allergies    MEDICATIONS:  STANDING MEDICATIONS  aspirin  chewable 81 milliGRAM(s) Oral daily  atorvastatin 40 milliGRAM(s) Oral at bedtime  docusate sodium 100 milliGRAM(s) Oral two times a day  heparin  Injectable 5000 Unit(s) SubCutaneous every 8 hours  immune   globulin 10% (GAMMAGARD) IVPB 20 Gram(s) IV Intermittent every 24 hours  lisinopril 40 milliGRAM(s) Oral daily  metoprolol succinate ER 50 milliGRAM(s) Oral daily  pantoprazole    Tablet 40 milliGRAM(s) Oral before breakfast  polyethylene glycol 3350 17 Gram(s) Oral every 12 hours  predniSONE   Tablet 40 milliGRAM(s) Oral daily  thiamine 100 milliGRAM(s) Oral daily    PRN MEDICATIONS  acetaminophen   Tablet .. 650 milliGRAM(s) Oral every 6 hours PRN    VITALS:   T(F): 98.9  HR: 73  BP: 143/60  RR: 20  SpO2: --    LABS:                        12.7   7.72  )-----------( 292      ( 25 Oct 2018 06:07 )             37.6     10-25    135  |  100  |  15  ----------------------------<  107<H>  4.3   |  23  |  0.5<L>    Ca    8.6      25 Oct 2018 06:07  Mg     2.4     10-25    TPro  5.2<L>  /  Alb  3.0<L>  /  TBili  0.3  /  DBili  x   /  AST  18  /  ALT  23  /  AlkPhos  61  10-25                  RADIOLOGY:    PHYSICAL EXAM:  GEN: No acute distress  LUNGS: Clear to auscultation bilaterally   HEART: Regular  ABD: Soft, non-tender, non-distended.  EXT: NC/NC/NE/2+PP/TEJEDA/Skin Intact.   NEURO: AAOX3, left side hemineglect, patient was slightly more responsive today, one word answers  Intravenous access:   NG tube:   Olivas Catheter:

## 2018-10-25 NOTE — CHART NOTE - NSCHARTNOTEFT_GEN_A_CORE
Registered Dietitian Follow-Up     Patient Profile Reviewed                           Yes [x]   No []     Nutrition History Previously Obtained        Yes []  No [x] pt confused no fam present      Pertinent Subjective Information: Per PCA, pt eating very well and per EMR, is now consistently consuming at least 75% of meals. Tolerating ground up diet with nectar thick liquids per SLP recs 10/23     Pertinent Medical Interventions: pt admitted w/ rapidly progressive neurological disease that appears to be inflammatory, likely demyelinating disease. being treated w/ steroids     Diet order: dysphagia 2 w nectar thick liquids     Anthropometrics: ht/wt are now available  - Ht. 157.48cm  - Wt. 49.5 kg  - %wt change  - BMI: 20.0  - IBW     Pertinent Lab Data: 10/25: BUN 15, Cr 0.5, Glucose 107      Pertinent Meds: aspirin, heparin, prednisone, thiamine, atorvastatin, lisinopril, metoprolol     Physical Findings:  - Appearance: disoriented  - GI function: fecal incontinece 10/25  - Tubes:  - Oral/Mouth cavity: tolerating ground diet with nectar thick liquids  - Skin: intact     Nutrition Requirements  Weight Used: 49.5kg     Estimated Energy Needs    Continue []  Adjust [x] ht and wt are now available  Adjusted Energy Recommendations: 3232-8986 kcals/day (MSJ x 1.2-1.3)         Estimated Protein Needs    Continue []  Adjust [x]   Adjusted Protein Recommendations:  50-60 g/day (1-1.2 g/kg ABW)     Estimated Fluid Needs        Continue []  Adjust []  Adjusted Fluid Recommendations:   1ml/kcal      Nutrient Intake: meeting needs        [] Previous Nutrition Diagnosis: Inadequate oral intake (resolved)            [] Ongoing          [] Resolved    Nutrition Intervention: Meals and snacks     Goal/Expected Outcome: PO 50-75% over next 7 days     Indicator/Monitoring: RD to monitor energy intake

## 2018-10-26 LAB
ALBUMIN SERPL ELPH-MCNC: 3.2 G/DL — LOW (ref 3.5–5.2)
ALP SERPL-CCNC: 64 U/L — SIGNIFICANT CHANGE UP (ref 30–115)
ALT FLD-CCNC: 23 U/L — SIGNIFICANT CHANGE UP (ref 0–41)
ANION GAP SERPL CALC-SCNC: 12 MMOL/L — SIGNIFICANT CHANGE UP (ref 7–14)
AST SERPL-CCNC: 18 U/L — SIGNIFICANT CHANGE UP (ref 0–41)
BASOPHILS # BLD AUTO: 0 K/UL — SIGNIFICANT CHANGE UP (ref 0–0.2)
BASOPHILS NFR BLD AUTO: 0 % — SIGNIFICANT CHANGE UP (ref 0–1)
BILIRUB SERPL-MCNC: 0.3 MG/DL — SIGNIFICANT CHANGE UP (ref 0.2–1.2)
BUN SERPL-MCNC: 15 MG/DL — SIGNIFICANT CHANGE UP (ref 10–20)
CALCIUM SERPL-MCNC: 8.6 MG/DL — SIGNIFICANT CHANGE UP (ref 8.5–10.1)
CHLORIDE SERPL-SCNC: 97 MMOL/L — LOW (ref 98–110)
CO2 SERPL-SCNC: 24 MMOL/L — SIGNIFICANT CHANGE UP (ref 17–32)
CREAT SERPL-MCNC: 0.5 MG/DL — LOW (ref 0.7–1.5)
EOSINOPHIL # BLD AUTO: 0 K/UL — SIGNIFICANT CHANGE UP (ref 0–0.7)
EOSINOPHIL NFR BLD AUTO: 0 % — SIGNIFICANT CHANGE UP (ref 0–8)
GLUCOSE SERPL-MCNC: 112 MG/DL — HIGH (ref 70–99)
HCT VFR BLD CALC: 39.9 % — SIGNIFICANT CHANGE UP (ref 37–47)
HGB BLD-MCNC: 13.2 G/DL — SIGNIFICANT CHANGE UP (ref 12–16)
LYMPHOCYTES # BLD AUTO: 0.4 K/UL — LOW (ref 1.2–3.4)
LYMPHOCYTES # BLD AUTO: 7.9 % — LOW (ref 20.5–51.1)
LYMPHOCYTES # SPEC AUTO: 0.9 % — HIGH (ref 0–0)
MAGNESIUM SERPL-MCNC: 2.4 MG/DL — SIGNIFICANT CHANGE UP (ref 1.8–2.4)
MANUAL SMEAR VERIFICATION: SIGNIFICANT CHANGE UP
MCHC RBC-ENTMCNC: 28.5 PG — SIGNIFICANT CHANGE UP (ref 27–31)
MCHC RBC-ENTMCNC: 33.1 G/DL — SIGNIFICANT CHANGE UP (ref 32–37)
MCV RBC AUTO: 86.2 FL — SIGNIFICANT CHANGE UP (ref 81–99)
MONOCYTES # BLD AUTO: 0.27 K/UL — SIGNIFICANT CHANGE UP (ref 0.1–0.6)
MONOCYTES NFR BLD AUTO: 5.2 % — SIGNIFICANT CHANGE UP (ref 1.7–9.3)
MYELOCYTES NFR BLD: 3.5 % — HIGH (ref 0–0)
NEUTROPHILS # BLD AUTO: 3.98 K/UL — SIGNIFICANT CHANGE UP (ref 1.4–6.5)
NEUTROPHILS NFR BLD AUTO: 73.7 % — SIGNIFICANT CHANGE UP (ref 42.2–75.2)
NEUTS BAND # BLD: 4.4 % — SIGNIFICANT CHANGE UP (ref 0–6)
NRBC # BLD: 0 /100 WBCS — SIGNIFICANT CHANGE UP (ref 0–0)
PLAT MORPH BLD: NORMAL — SIGNIFICANT CHANGE UP
PLATELET # BLD AUTO: 241 K/UL — SIGNIFICANT CHANGE UP (ref 130–400)
POIKILOCYTOSIS BLD QL AUTO: SIGNIFICANT CHANGE UP
POTASSIUM SERPL-MCNC: 4.4 MMOL/L — SIGNIFICANT CHANGE UP (ref 3.5–5)
POTASSIUM SERPL-SCNC: 4.4 MMOL/L — SIGNIFICANT CHANGE UP (ref 3.5–5)
PROMYELOCYTES # FLD: 0.9 % — HIGH (ref 0–0)
PROT SERPL-MCNC: 6.2 G/DL — SIGNIFICANT CHANGE UP (ref 6–8)
RBC # BLD: 4.63 M/UL — SIGNIFICANT CHANGE UP (ref 4.2–5.4)
RBC # FLD: 14.7 % — HIGH (ref 11.5–14.5)
RBC BLD AUTO: NORMAL — SIGNIFICANT CHANGE UP
SODIUM SERPL-SCNC: 133 MMOL/L — LOW (ref 135–146)
VARIANT LYMPHS # BLD: 3.5 % — SIGNIFICANT CHANGE UP (ref 0–5)
WBC # BLD: 5.1 K/UL — SIGNIFICANT CHANGE UP (ref 4.8–10.8)
WBC # FLD AUTO: 5.1 K/UL — SIGNIFICANT CHANGE UP (ref 4.8–10.8)

## 2018-10-26 RX ADMIN — Medication 100 MILLIGRAM(S): at 14:18

## 2018-10-26 RX ADMIN — ATORVASTATIN CALCIUM 40 MILLIGRAM(S): 80 TABLET, FILM COATED ORAL at 21:06

## 2018-10-26 RX ADMIN — HEPARIN SODIUM 5000 UNIT(S): 5000 INJECTION INTRAVENOUS; SUBCUTANEOUS at 14:19

## 2018-10-26 RX ADMIN — IMMUNE GLOBULIN (HUMAN) 33.33 GRAM(S): 10 INJECTION INTRAVENOUS; SUBCUTANEOUS at 15:54

## 2018-10-26 RX ADMIN — PANTOPRAZOLE SODIUM 40 MILLIGRAM(S): 20 TABLET, DELAYED RELEASE ORAL at 05:47

## 2018-10-26 RX ADMIN — Medication 100 MILLIGRAM(S): at 05:47

## 2018-10-26 RX ADMIN — Medication 50 MILLIGRAM(S): at 05:47

## 2018-10-26 RX ADMIN — Medication 81 MILLIGRAM(S): at 14:18

## 2018-10-26 RX ADMIN — HEPARIN SODIUM 5000 UNIT(S): 5000 INJECTION INTRAVENOUS; SUBCUTANEOUS at 21:06

## 2018-10-26 RX ADMIN — LISINOPRIL 40 MILLIGRAM(S): 2.5 TABLET ORAL at 05:47

## 2018-10-26 RX ADMIN — Medication 40 MILLIGRAM(S): at 05:47

## 2018-10-26 RX ADMIN — HEPARIN SODIUM 5000 UNIT(S): 5000 INJECTION INTRAVENOUS; SUBCUTANEOUS at 05:47

## 2018-10-26 NOTE — PROGRESS NOTE ADULT - ASSESSMENT
89 yoF with worsening autoimmune encephalitis on prednisone    Family does not want biopsy or any other invasive measures    -ESR 61  -CT/MRI/LP/EEG/CTA showing no clear etiology  -day 2/5 for IVIG, 0.4 mg/kg, 20 mg over 6 hours, premedicating with tylenol  -on prednisone 40 daily  -currently no change in symptoms    # HLD  -atorvastatin    # HTN  -lisinopril and metoprolol    # Dysphagia  -dysphagia 2 with honey thick liquids / supervised meals with no straws    Discussed treatment with family    DVT GI PPX 89 yoF with worsening autoimmune encephalitis vs neurodegenerative dz    Family does not want biopsy or any other invasive measures    -ESR 61  -CT/MRI/LP/EEG/CTA showing no clear etiology  -day 2/5 for IVIG, 0.4 mg/kg, 20 mg over 6 hours, premedicating with tylenol  -on prednisone 40 daily  -currently no change in symptoms    # HLD  -atorvastatin    # HTN  -lisinopril and metoprolol    # Dysphagia  -dysphagia 2 with honey thick liquids / supervised meals with no straws    Discussed treatment with family    DVT GI PPX

## 2018-10-26 NOTE — PROGRESS NOTE ADULT - SUBJECTIVE AND OBJECTIVE BOX
SUBJECTIVE:    Patient is a 89y old Female who presents with a chief complaint of altered mental status (25 Oct 2018 17:46)      HPI:  (((I went to see the patient this night 4:45 am, patient is barely responsive, everything written here is the information I got from the old chart from the [facility].)))(Talked with the daughter also regarding this.)    88 Y/O  with PMH of HTN, Hyperlipidemia went to the ED of [facility] for constipation. Manual disimpaction was done and patient was sent home. Next day patient's personality started changing, Became angry on , a bit combative, went to pmd where ct head was ordered. The Ct showed concern for a brain mass. She was readmitted to [facility] for further workup. Repeat CT head: decreased density in right frontal white matter which is likely related to chronic ischemic change. CT angiogram was unremarkable  MRI of the brain showed multifocal subcortical signal abnormalities with suggestion of cortical involvement. Findings are atypical for small vessel disease. Inflammatory etiology should be suspected. No mass.   urinalysis was done. Mildly positive for uti. She was started on levofloxacin but it was stopped due to a negative urine culture.   Diagnosis of metabolic encephalopathy prob 2/2 to uti was made. slurred speech resolved. (possible tia) , hypokalemia was corrected. LP was done but result has not been mentioned in papers. ECHO showed EF 75-80%, mod AS, mild AR, TR. EEG showed generalized slowing   As per daughter before this episode patient was AAOx3 and they were not satisfied with the workup in [facility] so she was transferred to [facility]. (14 Oct 2018 04:53)      Currently admitted to medicine with the primary diagnosis of      Besides the pertinent positives and negatives described above, the ROS was within normal limits.    PAST MEDICAL & SURGICAL HISTORY  Hyperlipidemia  HTN (hypertension)  No significant past surgical history    SOCIAL HISTORY:    ALLERGIES:  No Known Allergies    MEDICATIONS:  STANDING MEDICATIONS  aspirin  chewable 81 milliGRAM(s) Oral daily  atorvastatin 40 milliGRAM(s) Oral at bedtime  docusate sodium 100 milliGRAM(s) Oral two times a day  heparin  Injectable 5000 Unit(s) SubCutaneous every 8 hours  immune   globulin 10% (GAMMAGARD) IVPB 20 Gram(s) IV Intermittent every 24 hours  lisinopril 40 milliGRAM(s) Oral daily  metoprolol succinate ER 50 milliGRAM(s) Oral daily  pantoprazole    Tablet 40 milliGRAM(s) Oral before breakfast  polyethylene glycol 3350 17 Gram(s) Oral every 12 hours  predniSONE   Tablet 40 milliGRAM(s) Oral daily  thiamine 100 milliGRAM(s) Oral daily    PRN MEDICATIONS  acetaminophen   Tablet .. 650 milliGRAM(s) Oral every 6 hours PRN    VITALS:   T(F): 98.5  HR: 93  BP: 186/82  RR: 20  SpO2: --    LABS:                        13.2   5.10  )-----------( 241      ( 26 Oct 2018 06:22 )             39.9     10-26    133<L>  |  97<L>  |  15  ----------------------------<  112<H>  4.4   |  24  |  0.5<L>    Ca    8.6      26 Oct 2018 06:22  Mg     2.4     10-26    TPro  6.2  /  Alb  3.2<L>  /  TBili  0.3  /  DBili  x   /  AST  18  /  ALT  23  /  AlkPhos  64  10-26                  RADIOLOGY:    PHYSICAL EXAM:  GEN: extremely lethargic  LUNGS: Clear to auscultation bilaterally   HEART: Regular  ABD: Soft, non-tender, non-distended.  EXT: NC/NC/NE/2+PP/TEJEDA/Skin Intact.   NEURO: AAOX1, left hemineglect    Intravenous access:   NG tube:   Olivas Catheter:

## 2018-10-27 LAB
ALBUMIN SERPL ELPH-MCNC: 3.1 G/DL — LOW (ref 3.5–5.2)
ALP SERPL-CCNC: 63 U/L — SIGNIFICANT CHANGE UP (ref 30–115)
ALT FLD-CCNC: 25 U/L — SIGNIFICANT CHANGE UP (ref 0–41)
ANION GAP SERPL CALC-SCNC: 14 MMOL/L — SIGNIFICANT CHANGE UP (ref 7–14)
AST SERPL-CCNC: 23 U/L — SIGNIFICANT CHANGE UP (ref 0–41)
BASOPHILS # BLD AUTO: 0.01 K/UL — SIGNIFICANT CHANGE UP (ref 0–0.2)
BASOPHILS NFR BLD AUTO: 0.2 % — SIGNIFICANT CHANGE UP (ref 0–1)
BILIRUB SERPL-MCNC: 0.2 MG/DL — SIGNIFICANT CHANGE UP (ref 0.2–1.2)
BUN SERPL-MCNC: 18 MG/DL — SIGNIFICANT CHANGE UP (ref 10–20)
CALCIUM SERPL-MCNC: 8.5 MG/DL — SIGNIFICANT CHANGE UP (ref 8.5–10.1)
CHLORIDE SERPL-SCNC: 98 MMOL/L — SIGNIFICANT CHANGE UP (ref 98–110)
CO2 SERPL-SCNC: 21 MMOL/L — SIGNIFICANT CHANGE UP (ref 17–32)
CREAT SERPL-MCNC: 0.6 MG/DL — LOW (ref 0.7–1.5)
EOSINOPHIL # BLD AUTO: 0 K/UL — SIGNIFICANT CHANGE UP (ref 0–0.7)
EOSINOPHIL NFR BLD AUTO: 0 % — SIGNIFICANT CHANGE UP (ref 0–8)
GLUCOSE SERPL-MCNC: 217 MG/DL — HIGH (ref 70–99)
HCT VFR BLD CALC: 38.5 % — SIGNIFICANT CHANGE UP (ref 37–47)
HGB BLD-MCNC: 12.9 G/DL — SIGNIFICANT CHANGE UP (ref 12–16)
IMM GRANULOCYTES NFR BLD AUTO: 4.8 % — HIGH (ref 0.1–0.3)
LYMPHOCYTES # BLD AUTO: 0.33 K/UL — LOW (ref 1.2–3.4)
LYMPHOCYTES # BLD AUTO: 7.1 % — LOW (ref 20.5–51.1)
MAGNESIUM SERPL-MCNC: 2.2 MG/DL — SIGNIFICANT CHANGE UP (ref 1.8–2.4)
MCHC RBC-ENTMCNC: 28.7 PG — SIGNIFICANT CHANGE UP (ref 27–31)
MCHC RBC-ENTMCNC: 33.5 G/DL — SIGNIFICANT CHANGE UP (ref 32–37)
MCV RBC AUTO: 85.6 FL — SIGNIFICANT CHANGE UP (ref 81–99)
MONOCYTES # BLD AUTO: 0.16 K/UL — SIGNIFICANT CHANGE UP (ref 0.1–0.6)
MONOCYTES NFR BLD AUTO: 3.5 % — SIGNIFICANT CHANGE UP (ref 1.7–9.3)
NEUTROPHILS # BLD AUTO: 3.9 K/UL — SIGNIFICANT CHANGE UP (ref 1.4–6.5)
NEUTROPHILS NFR BLD AUTO: 84.4 % — HIGH (ref 42.2–75.2)
NRBC # BLD: 0 /100 WBCS — SIGNIFICANT CHANGE UP (ref 0–0)
PLATELET # BLD AUTO: 225 K/UL — SIGNIFICANT CHANGE UP (ref 130–400)
POTASSIUM SERPL-MCNC: 4.1 MMOL/L — SIGNIFICANT CHANGE UP (ref 3.5–5)
POTASSIUM SERPL-SCNC: 4.1 MMOL/L — SIGNIFICANT CHANGE UP (ref 3.5–5)
PROT SERPL-MCNC: 6.9 G/DL — SIGNIFICANT CHANGE UP (ref 6–8)
RBC # BLD: 4.5 M/UL — SIGNIFICANT CHANGE UP (ref 4.2–5.4)
RBC # FLD: 14.6 % — HIGH (ref 11.5–14.5)
SODIUM SERPL-SCNC: 133 MMOL/L — LOW (ref 135–146)
WBC # BLD: 4.62 K/UL — LOW (ref 4.8–10.8)
WBC # FLD AUTO: 4.62 K/UL — LOW (ref 4.8–10.8)

## 2018-10-27 RX ORDER — DOCUSATE SODIUM 100 MG
100 CAPSULE ORAL
Qty: 0 | Refills: 0 | Status: DISCONTINUED | OUTPATIENT
Start: 2018-10-27 | End: 2018-11-01

## 2018-10-27 RX ADMIN — Medication 81 MILLIGRAM(S): at 11:28

## 2018-10-27 RX ADMIN — PANTOPRAZOLE SODIUM 40 MILLIGRAM(S): 20 TABLET, DELAYED RELEASE ORAL at 05:20

## 2018-10-27 RX ADMIN — HEPARIN SODIUM 5000 UNIT(S): 5000 INJECTION INTRAVENOUS; SUBCUTANEOUS at 21:32

## 2018-10-27 RX ADMIN — Medication 100 MILLIGRAM(S): at 17:44

## 2018-10-27 RX ADMIN — LISINOPRIL 40 MILLIGRAM(S): 2.5 TABLET ORAL at 05:20

## 2018-10-27 RX ADMIN — ATORVASTATIN CALCIUM 40 MILLIGRAM(S): 80 TABLET, FILM COATED ORAL at 21:31

## 2018-10-27 RX ADMIN — Medication 100 MILLIGRAM(S): at 11:28

## 2018-10-27 RX ADMIN — Medication 40 MILLIGRAM(S): at 05:20

## 2018-10-27 RX ADMIN — POLYETHYLENE GLYCOL 3350 17 GRAM(S): 17 POWDER, FOR SOLUTION ORAL at 17:43

## 2018-10-27 RX ADMIN — IMMUNE GLOBULIN (HUMAN) 33.33 GRAM(S): 10 INJECTION INTRAVENOUS; SUBCUTANEOUS at 15:44

## 2018-10-27 RX ADMIN — HEPARIN SODIUM 5000 UNIT(S): 5000 INJECTION INTRAVENOUS; SUBCUTANEOUS at 05:20

## 2018-10-27 RX ADMIN — Medication 50 MILLIGRAM(S): at 05:20

## 2018-10-27 RX ADMIN — HEPARIN SODIUM 5000 UNIT(S): 5000 INJECTION INTRAVENOUS; SUBCUTANEOUS at 13:08

## 2018-10-27 NOTE — PROGRESS NOTE ADULT - SUBJECTIVE AND OBJECTIVE BOX
TRINH POLANCO  89y  Female  ***My note supercedes ALL resident notes that I sign.  My corrections for their notes are in my note.***    I can be reached directly on [employer] 0425. My office number is [phone]. My personal cell number is [phone].    INTERVAL EVENTS: Here for f/u of lethargy. Pt remains fairly lethargic, but she is eating her entire meals. She is a porter lift to chair, but she tolerates it well. Pt got steroids and now getting IVIG. No major events so far.    T(F): 98.5 (10-27-18 @ 07:33), Max: 98.8 (10-26-18 @ 23:25)  HR: 74 (10-27-18 @ 07:33) (65 - 93)  BP: 179/77 (10-27-18 @ 07:33) (104/57 - 186/82)  RR: 16 (10-27-18 @ 07:33) (16 - 20)  SpO2: --    GEN: extremely lethargic  LUNGS: Clear to auscultation bilaterally   HEART: s1 s2 rrr 2/6 sys murmur  ABD: Soft, non-tender, non-distended.  EXT: NC/NC/NE  NEURO: pt not awake, barely arousable, cannot assess orientation, pt did slightly shake my hand when I placed my hand in her's    LABS:                        13.2    (    86.2   5.10  )-----------( ---------      241      ( 26 Oct 2018 06:22 )             39.9    (    14.7     Comprehensive Metabolic Panel in AM (10.26.18 @ 06:22)    Sodium, Serum: 133 mmol/L    Potassium, Serum: 4.4 mmol/L    Chloride, Serum: 97 mmol/L    Carbon Dioxide, Serum: 24 mmol/L    Anion Gap, Serum: 12 mmol/L    Blood Urea Nitrogen, Serum: 15 mg/dL    Creatinine, Serum: 0.5 mg/dL    Glucose, Serum: 112 mg/dL    Calcium, Total Serum: 8.6 mg/dL    Protein Total, Serum: 6.2 g/dL    Albumin, Serum: 3.2 g/dL    Bilirubin Total, Serum: 0.3 mg/dL    Alkaline Phosphatase, Serum: 64 U/L    Aspartate Aminotransferase (AST/SGOT): 18 U/L    Alanine Aminotransferase (ALT/SGPT): 23 U/L    eGFR if Non : 86     RADIOLOGY & ADDITIONAL TESTS:  < from: EEG Monitoring Each 24 hours (10.21.18 @ 09:00) >  Impression  Abnormal due to presence of right hemispheric slowing      Clinical Correlation  Findings consistent with right hemispheric cerebral dysfunction    < end of copied text >      < from: Xray Chest 1 View- PORTABLE-Urgent (10.25.18 @ 22:33) >  Impression:      No radiographic evidence of acute cardiopulmonary disease.    < end of copied text >      < from: CT Angio Head w/ IV Cont (10.18.18 @ 09:52) >    Impression:    Unremarkable CT angiogram of the head. No vessel irregularity or stenosis   to suggest vasculitis.    < end of copied text >      < from: MR Head w/wo IV Cont (10.16.18 @ 17:01) >  IMPRESSION:     Extensive expansile nonenhancing white matter signal abnormality   involving the right greater than left frontal lobes and the right   parietal lobe which is progressed since the prior MRI of 10/3/2018.  Findings are nonspecific and suggestive of an extensive inflammatory or   infectious encephalitis.    < end of copied text >    MEDICATIONS:      acetaminophen   Tablet .. 650 milliGRAM(s) Oral every 6 hours PRN  aspirin  chewable 81 milliGRAM(s) Oral daily  atorvastatin 40 milliGRAM(s) Oral at bedtime  docusate sodium 100 milliGRAM(s) Oral two times a day  heparin  Injectable 5000 Unit(s) SubCutaneous every 8 hours  immune   globulin 10% (GAMMAGARD) IVPB 20 Gram(s) IV Intermittent every 24 hours  lisinopril 40 milliGRAM(s) Oral daily  metoprolol succinate ER 50 milliGRAM(s) Oral daily  pantoprazole    Tablet 40 milliGRAM(s) Oral before breakfast  polyethylene glycol 3350 17 Gram(s) Oral every 12 hours  predniSONE   Tablet 40 milliGRAM(s) Oral daily  thiamine 100 milliGRAM(s) Oral daily

## 2018-10-27 NOTE — PROGRESS NOTE ADULT - ASSESSMENT
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

## 2018-10-28 LAB
ALBUMIN SERPL ELPH-MCNC: 2.7 G/DL — LOW (ref 3.5–5.2)
ALP SERPL-CCNC: 53 U/L — SIGNIFICANT CHANGE UP (ref 30–115)
ALT FLD-CCNC: 25 U/L — SIGNIFICANT CHANGE UP (ref 0–41)
ANION GAP SERPL CALC-SCNC: 10 MMOL/L — SIGNIFICANT CHANGE UP (ref 7–14)
AST SERPL-CCNC: 29 U/L — SIGNIFICANT CHANGE UP (ref 0–41)
BASOPHILS # BLD AUTO: 0.01 K/UL — SIGNIFICANT CHANGE UP (ref 0–0.2)
BASOPHILS NFR BLD AUTO: 0.2 % — SIGNIFICANT CHANGE UP (ref 0–1)
BILIRUB SERPL-MCNC: 0.3 MG/DL — SIGNIFICANT CHANGE UP (ref 0.2–1.2)
BUN SERPL-MCNC: 19 MG/DL — SIGNIFICANT CHANGE UP (ref 10–20)
CALCIUM SERPL-MCNC: 8.3 MG/DL — LOW (ref 8.5–10.1)
CHLORIDE SERPL-SCNC: 98 MMOL/L — SIGNIFICANT CHANGE UP (ref 98–110)
CO2 SERPL-SCNC: 23 MMOL/L — SIGNIFICANT CHANGE UP (ref 17–32)
CREAT SERPL-MCNC: <0.5 MG/DL — LOW (ref 0.7–1.5)
EOSINOPHIL # BLD AUTO: 0 K/UL — SIGNIFICANT CHANGE UP (ref 0–0.7)
EOSINOPHIL NFR BLD AUTO: 0 % — SIGNIFICANT CHANGE UP (ref 0–8)
GLUCOSE SERPL-MCNC: 100 MG/DL — HIGH (ref 70–99)
HCT VFR BLD CALC: 34.8 % — LOW (ref 37–47)
HGB BLD-MCNC: 11.7 G/DL — LOW (ref 12–16)
IMM GRANULOCYTES NFR BLD AUTO: 2.8 % — HIGH (ref 0.1–0.3)
LYMPHOCYTES # BLD AUTO: 0.69 K/UL — LOW (ref 1.2–3.4)
LYMPHOCYTES # BLD AUTO: 16 % — LOW (ref 20.5–51.1)
MAGNESIUM SERPL-MCNC: 2.2 MG/DL — SIGNIFICANT CHANGE UP (ref 1.8–2.4)
MCHC RBC-ENTMCNC: 28.7 PG — SIGNIFICANT CHANGE UP (ref 27–31)
MCHC RBC-ENTMCNC: 33.6 G/DL — SIGNIFICANT CHANGE UP (ref 32–37)
MCV RBC AUTO: 85.5 FL — SIGNIFICANT CHANGE UP (ref 81–99)
MONOCYTES # BLD AUTO: 0.55 K/UL — SIGNIFICANT CHANGE UP (ref 0.1–0.6)
MONOCYTES NFR BLD AUTO: 12.8 % — HIGH (ref 1.7–9.3)
NEUTROPHILS # BLD AUTO: 2.94 K/UL — SIGNIFICANT CHANGE UP (ref 1.4–6.5)
NEUTROPHILS NFR BLD AUTO: 68.2 % — SIGNIFICANT CHANGE UP (ref 42.2–75.2)
NRBC # BLD: 0 /100 WBCS — SIGNIFICANT CHANGE UP (ref 0–0)
PLATELET # BLD AUTO: 169 K/UL — SIGNIFICANT CHANGE UP (ref 130–400)
POTASSIUM SERPL-MCNC: 3.9 MMOL/L — SIGNIFICANT CHANGE UP (ref 3.5–5)
POTASSIUM SERPL-SCNC: 3.9 MMOL/L — SIGNIFICANT CHANGE UP (ref 3.5–5)
PROT SERPL-MCNC: 6.7 G/DL — SIGNIFICANT CHANGE UP (ref 6–8)
RBC # BLD: 4.07 M/UL — LOW (ref 4.2–5.4)
RBC # FLD: 14.7 % — HIGH (ref 11.5–14.5)
SODIUM SERPL-SCNC: 131 MMOL/L — LOW (ref 135–146)
WBC # BLD: 4.31 K/UL — LOW (ref 4.8–10.8)
WBC # FLD AUTO: 4.31 K/UL — LOW (ref 4.8–10.8)

## 2018-10-28 RX ADMIN — PANTOPRAZOLE SODIUM 40 MILLIGRAM(S): 20 TABLET, DELAYED RELEASE ORAL at 05:25

## 2018-10-28 RX ADMIN — HEPARIN SODIUM 5000 UNIT(S): 5000 INJECTION INTRAVENOUS; SUBCUTANEOUS at 05:24

## 2018-10-28 RX ADMIN — LISINOPRIL 40 MILLIGRAM(S): 2.5 TABLET ORAL at 05:24

## 2018-10-28 RX ADMIN — Medication 100 MILLIGRAM(S): at 05:24

## 2018-10-28 RX ADMIN — IMMUNE GLOBULIN (HUMAN) 33.33 GRAM(S): 10 INJECTION INTRAVENOUS; SUBCUTANEOUS at 15:55

## 2018-10-28 RX ADMIN — POLYETHYLENE GLYCOL 3350 17 GRAM(S): 17 POWDER, FOR SOLUTION ORAL at 17:45

## 2018-10-28 RX ADMIN — Medication 100 MILLIGRAM(S): at 17:45

## 2018-10-28 RX ADMIN — HEPARIN SODIUM 5000 UNIT(S): 5000 INJECTION INTRAVENOUS; SUBCUTANEOUS at 21:45

## 2018-10-28 RX ADMIN — Medication 81 MILLIGRAM(S): at 11:32

## 2018-10-28 RX ADMIN — Medication 40 MILLIGRAM(S): at 05:25

## 2018-10-28 RX ADMIN — Medication 100 MILLIGRAM(S): at 11:32

## 2018-10-28 RX ADMIN — ATORVASTATIN CALCIUM 40 MILLIGRAM(S): 80 TABLET, FILM COATED ORAL at 21:45

## 2018-10-28 RX ADMIN — POLYETHYLENE GLYCOL 3350 17 GRAM(S): 17 POWDER, FOR SOLUTION ORAL at 05:25

## 2018-10-28 RX ADMIN — HEPARIN SODIUM 5000 UNIT(S): 5000 INJECTION INTRAVENOUS; SUBCUTANEOUS at 13:37

## 2018-10-28 RX ADMIN — Medication 50 MILLIGRAM(S): at 05:24

## 2018-10-28 NOTE — PROGRESS NOTE ADULT - SUBJECTIVE AND OBJECTIVE BOX
TRINH POLANCO  89y  Female  ***My note supercedes ALL resident notes that I sign.  My corrections for their notes are in my note.***    I can be reached directly on [employer] [id number]. My office number is [phone]. My personal cell number is [phone].    INTERVAL EVENTS: Here for f/u of encephalitis. There is a big improvement in MS today. Pt can talk fairly well, she just needs time to formulate what she wants to say. However, she knows herself and that she is in a hospital. She also knew the month, the year and that Halloween was coming. Pt is eating well w/ 1:1 feeds.    T(F): 96.3 (10-27-18 @ 23:40), Max: 98.5 (10-27-18 @ 15:45)  HR: 79 (10-27-18 @ 23:40) (79 - 81)  BP: 131/63 (10-27-18 @ 23:40) (126/60 - 131/63)  RR: 18 (10-27-18 @ 23:40) (16 - 18)  SpO2: --    GEN: NAD  LUNGS: Clear to auscultation bilaterally   HEART: s1 s2 rrr 2/6 sys murmur  ABD: Soft, non-tender, non-distended.  EXT: NC/NC/NE  NEURO: pt is much more awake and alert; she has left facial droop and palsy; she has a dense left hemiparesis of arms and legs (0/5 power); she has preferential gaze to right; seems to have hemineglect of left side    LABS:                        11.7    (    85.5   4.31  )-----------( ---------      169      ( 28 Oct 2018 06:04 )             34.8    (    14.7     131   (   98   (   100      10-28-18 @ 06:04  ----------------------               3.9   (   23   (   19                             -----                        <0.5  Ca  8.3   Mg  2.2    P   --     LFT  6.7  (  0.3  (  29       10-28-18 @ 06:04  -------------------------  2.7  (  53  (  25    RADIOLOGY & ADDITIONAL TESTS:      MEDICATIONS:    acetaminophen   Tablet .. 650 milliGRAM(s) Oral every 6 hours PRN  aspirin  chewable 81 milliGRAM(s) Oral daily  atorvastatin 40 milliGRAM(s) Oral at bedtime  docusate sodium Liquid 100 milliGRAM(s) Oral two times a day  heparin  Injectable 5000 Unit(s) SubCutaneous every 8 hours  immune   globulin 10% (GAMMAGARD) IVPB 20 Gram(s) IV Intermittent every 24 hours  lisinopril 40 milliGRAM(s) Oral daily  metoprolol succinate ER 50 milliGRAM(s) Oral daily  pantoprazole    Tablet 40 milliGRAM(s) Oral before breakfast  polyethylene glycol 3350 17 Gram(s) Oral every 12 hours  predniSONE   Tablet 40 milliGRAM(s) Oral daily  thiamine 100 milliGRAM(s) Oral daily

## 2018-10-28 NOTE — PROGRESS NOTE ADULT - ASSESSMENT
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

## 2018-10-29 LAB
ALBUMIN SERPL ELPH-MCNC: 2.9 G/DL — LOW (ref 3.5–5.2)
ALP SERPL-CCNC: 57 U/L — SIGNIFICANT CHANGE UP (ref 30–115)
ALT FLD-CCNC: 31 U/L — SIGNIFICANT CHANGE UP (ref 0–41)
ANION GAP SERPL CALC-SCNC: 9 MMOL/L — SIGNIFICANT CHANGE UP (ref 7–14)
AST SERPL-CCNC: 36 U/L — SIGNIFICANT CHANGE UP (ref 0–41)
BASOPHILS # BLD AUTO: 0.01 K/UL — SIGNIFICANT CHANGE UP (ref 0–0.2)
BASOPHILS NFR BLD AUTO: 0.2 % — SIGNIFICANT CHANGE UP (ref 0–1)
BILIRUB SERPL-MCNC: 0.5 MG/DL — SIGNIFICANT CHANGE UP (ref 0.2–1.2)
BUN SERPL-MCNC: 17 MG/DL — SIGNIFICANT CHANGE UP (ref 10–20)
CALCIUM SERPL-MCNC: 8.5 MG/DL — SIGNIFICANT CHANGE UP (ref 8.5–10.1)
CHLORIDE SERPL-SCNC: 95 MMOL/L — LOW (ref 98–110)
CO2 SERPL-SCNC: 22 MMOL/L — SIGNIFICANT CHANGE UP (ref 17–32)
CREAT SERPL-MCNC: 0.5 MG/DL — LOW (ref 0.7–1.5)
EOSINOPHIL # BLD AUTO: 0.01 K/UL — SIGNIFICANT CHANGE UP (ref 0–0.7)
EOSINOPHIL NFR BLD AUTO: 0.2 % — SIGNIFICANT CHANGE UP (ref 0–8)
GLUCOSE BLDC GLUCOMTR-MCNC: 189 MG/DL — HIGH (ref 70–99)
GLUCOSE BLDC GLUCOMTR-MCNC: 98 MG/DL — SIGNIFICANT CHANGE UP (ref 70–99)
GLUCOSE SERPL-MCNC: 105 MG/DL — HIGH (ref 70–99)
HCT VFR BLD CALC: 35.1 % — LOW (ref 37–47)
HGB BLD-MCNC: 12 G/DL — SIGNIFICANT CHANGE UP (ref 12–16)
IMM GRANULOCYTES NFR BLD AUTO: 1.6 % — HIGH (ref 0.1–0.3)
LYMPHOCYTES # BLD AUTO: 0.74 K/UL — LOW (ref 1.2–3.4)
LYMPHOCYTES # BLD AUTO: 13.5 % — LOW (ref 20.5–51.1)
MAGNESIUM SERPL-MCNC: 2.1 MG/DL — SIGNIFICANT CHANGE UP (ref 1.8–2.4)
MCHC RBC-ENTMCNC: 28.9 PG — SIGNIFICANT CHANGE UP (ref 27–31)
MCHC RBC-ENTMCNC: 34.2 G/DL — SIGNIFICANT CHANGE UP (ref 32–37)
MCV RBC AUTO: 84.6 FL — SIGNIFICANT CHANGE UP (ref 81–99)
MONOCYTES # BLD AUTO: 0.63 K/UL — HIGH (ref 0.1–0.6)
MONOCYTES NFR BLD AUTO: 11.5 % — HIGH (ref 1.7–9.3)
NEUTROPHILS # BLD AUTO: 3.99 K/UL — SIGNIFICANT CHANGE UP (ref 1.4–6.5)
NEUTROPHILS NFR BLD AUTO: 73 % — SIGNIFICANT CHANGE UP (ref 42.2–75.2)
NRBC # BLD: 0 /100 WBCS — SIGNIFICANT CHANGE UP (ref 0–0)
PLATELET # BLD AUTO: 149 K/UL — SIGNIFICANT CHANGE UP (ref 130–400)
POTASSIUM SERPL-MCNC: 3.9 MMOL/L — SIGNIFICANT CHANGE UP (ref 3.5–5)
POTASSIUM SERPL-SCNC: 3.9 MMOL/L — SIGNIFICANT CHANGE UP (ref 3.5–5)
PROT SERPL-MCNC: 7.3 G/DL — SIGNIFICANT CHANGE UP (ref 6–8)
RBC # BLD: 4.15 M/UL — LOW (ref 4.2–5.4)
RBC # FLD: 14.6 % — HIGH (ref 11.5–14.5)
SODIUM SERPL-SCNC: 126 MMOL/L — LOW (ref 135–146)
WBC # BLD: 5.47 K/UL — SIGNIFICANT CHANGE UP (ref 4.8–10.8)
WBC # FLD AUTO: 5.47 K/UL — SIGNIFICANT CHANGE UP (ref 4.8–10.8)

## 2018-10-29 RX ADMIN — Medication 100 MILLIGRAM(S): at 11:31

## 2018-10-29 RX ADMIN — HEPARIN SODIUM 5000 UNIT(S): 5000 INJECTION INTRAVENOUS; SUBCUTANEOUS at 15:37

## 2018-10-29 RX ADMIN — ATORVASTATIN CALCIUM 40 MILLIGRAM(S): 80 TABLET, FILM COATED ORAL at 22:13

## 2018-10-29 RX ADMIN — Medication 40 MILLIGRAM(S): at 05:49

## 2018-10-29 RX ADMIN — Medication 100 MILLIGRAM(S): at 17:40

## 2018-10-29 RX ADMIN — HEPARIN SODIUM 5000 UNIT(S): 5000 INJECTION INTRAVENOUS; SUBCUTANEOUS at 22:13

## 2018-10-29 RX ADMIN — HEPARIN SODIUM 5000 UNIT(S): 5000 INJECTION INTRAVENOUS; SUBCUTANEOUS at 05:49

## 2018-10-29 RX ADMIN — POLYETHYLENE GLYCOL 3350 17 GRAM(S): 17 POWDER, FOR SOLUTION ORAL at 17:40

## 2018-10-29 RX ADMIN — Medication 50 MILLIGRAM(S): at 05:49

## 2018-10-29 RX ADMIN — IMMUNE GLOBULIN (HUMAN) 33.33 GRAM(S): 10 INJECTION INTRAVENOUS; SUBCUTANEOUS at 17:34

## 2018-10-29 RX ADMIN — PANTOPRAZOLE SODIUM 40 MILLIGRAM(S): 20 TABLET, DELAYED RELEASE ORAL at 05:49

## 2018-10-29 RX ADMIN — Medication 81 MILLIGRAM(S): at 11:31

## 2018-10-29 RX ADMIN — LISINOPRIL 40 MILLIGRAM(S): 2.5 TABLET ORAL at 05:49

## 2018-10-29 NOTE — PROGRESS NOTE ADULT - ASSESSMENT
89 yoF with worsening autoimmune encephalitis vs neurodegenerative dz    Family does not want biopsy or any other invasive measures    -ESR 61  -CT/MRI/LP/EEG/CTA showing no clear etiology  -day 5/5 for IVIG, 0.4 mg/kg, 20 mg over 6 hours, premedicating with tylenol  -on prednisone 40 daily  -no change in patient's symptoms    # HLD  -atorvastatin    # HTN  -lisinopril and metoprolol    # Dysphagia  -dysphagia 2 with honey thick liquids / supervised meals with no straws    Discussed treatment with family    DVT GI PPX    dispo: [facility] sent for nursing homes

## 2018-10-29 NOTE — PROGRESS NOTE ADULT - SUBJECTIVE AND OBJECTIVE BOX
SUBJECTIVE:    Patient is a 89y old Female who presents with a chief complaint of altered mental status (28 Oct 2018 07:47)      HPI:  (((I went to see the patient this night 4:45 am, patient is barely responsive, everything written here is the information I got from the old chart from the [facility].)))(Talked with the daughter also regarding this.)    88 Y/O  with PMH of HTN, Hyperlipidemia went to the ED of [facility] for constipation. Manual disimpaction was done and patient was sent home. Next day patient's personality started changing, Became angry on , a bit combative, went to pmd where ct head was ordered. The Ct showed concern for a brain mass. She was readmitted to [facility] for further workup. Repeat CT head: decreased density in right frontal white matter which is likely related to chronic ischemic change. CT angiogram was unremarkable  MRI of the brain showed multifocal subcortical signal abnormalities with suggestion of cortical involvement. Findings are atypical for small vessel disease. Inflammatory etiology should be suspected. No mass.   urinalysis was done. Mildly positive for uti. She was started on levofloxacin but it was stopped due to a negative urine culture.   Diagnosis of metabolic encephalopathy prob 2/2 to uti was made. slurred speech resolved. (possible tia) , hypokalemia was corrected. LP was done but result has not been mentioned in papers. ECHO showed EF 75-80%, mod AS, mild AR, TR. EEG showed generalized slowing   As per daughter before this episode patient was AAOx3 and they were not satisfied with the workup in [facility] so she was transferred to [facility]. (14 Oct 2018 04:53)      Currently admitted to medicine with the primary diagnosis of      Besides the pertinent positives and negatives described above, the ROS was within normal limits.    PAST MEDICAL & SURGICAL HISTORY  Hyperlipidemia  HTN (hypertension)  No significant past surgical history    SOCIAL HISTORY:    ALLERGIES:  No Known Allergies    MEDICATIONS:  STANDING MEDICATIONS  aspirin  chewable 81 milliGRAM(s) Oral daily  atorvastatin 40 milliGRAM(s) Oral at bedtime  docusate sodium Liquid 100 milliGRAM(s) Oral two times a day  heparin  Injectable 5000 Unit(s) SubCutaneous every 8 hours  immune   globulin 10% (GAMMAGARD) IVPB 20 Gram(s) IV Intermittent every 24 hours  lisinopril 40 milliGRAM(s) Oral daily  metoprolol succinate ER 50 milliGRAM(s) Oral daily  pantoprazole    Tablet 40 milliGRAM(s) Oral before breakfast  polyethylene glycol 3350 17 Gram(s) Oral every 12 hours  predniSONE   Tablet 40 milliGRAM(s) Oral daily  thiamine 100 milliGRAM(s) Oral daily    PRN MEDICATIONS  acetaminophen   Tablet .. 650 milliGRAM(s) Oral every 6 hours PRN    VITALS:   T(F): 96.6  HR: 84  BP: 141/62  RR: 20  SpO2: --    LABS:                        12.0   5.47  )-----------( 149      ( 29 Oct 2018 07:36 )             35.1     10-29    126<L>  |  95<L>  |  17  ----------------------------<  105<H>  3.9   |  22  |  0.5<L>    Ca    8.5      29 Oct 2018 07:36  Mg     2.1     10-29    TPro  7.3  /  Alb  2.9<L>  /  TBili  0.5  /  DBili  x   /  AST  36  /  ALT  31  /  AlkPhos  57  10-29                  RADIOLOGY:    PHYSICAL EXAM:  GEN: No acute distress  LUNGS: Clear to auscultation bilaterally   HEART: Regular  ABD: Soft, non-tender, non-distended.  EXT: NC/NC/NE/2+PP/TEJEDA/Skin Intact.   NEURO: not responsive, extremely lethargic, left side hemineglect    Intravenous access:   NG tube:   Olivas Catheter:

## 2018-10-30 LAB
ALBUMIN SERPL ELPH-MCNC: 2.9 G/DL — LOW (ref 3.5–5.2)
ALP SERPL-CCNC: 57 U/L — SIGNIFICANT CHANGE UP (ref 30–115)
ALT FLD-CCNC: 34 U/L — SIGNIFICANT CHANGE UP (ref 0–41)
ANION GAP SERPL CALC-SCNC: 12 MMOL/L — SIGNIFICANT CHANGE UP (ref 7–14)
AST SERPL-CCNC: 39 U/L — SIGNIFICANT CHANGE UP (ref 0–41)
BASOPHILS # BLD AUTO: 0.02 K/UL — SIGNIFICANT CHANGE UP (ref 0–0.2)
BASOPHILS NFR BLD AUTO: 0.3 % — SIGNIFICANT CHANGE UP (ref 0–1)
BILIRUB SERPL-MCNC: 0.8 MG/DL — SIGNIFICANT CHANGE UP (ref 0.2–1.2)
BUN SERPL-MCNC: 22 MG/DL — HIGH (ref 10–20)
CALCIUM SERPL-MCNC: 8.5 MG/DL — SIGNIFICANT CHANGE UP (ref 8.5–10.1)
CHLORIDE SERPL-SCNC: 100 MMOL/L — SIGNIFICANT CHANGE UP (ref 98–110)
CO2 SERPL-SCNC: 21 MMOL/L — SIGNIFICANT CHANGE UP (ref 17–32)
CREAT SERPL-MCNC: 0.5 MG/DL — LOW (ref 0.7–1.5)
EOSINOPHIL # BLD AUTO: 0.01 K/UL — SIGNIFICANT CHANGE UP (ref 0–0.7)
EOSINOPHIL NFR BLD AUTO: 0.1 % — SIGNIFICANT CHANGE UP (ref 0–8)
GLUCOSE BLDC GLUCOMTR-MCNC: 129 MG/DL — HIGH (ref 70–99)
GLUCOSE SERPL-MCNC: 129 MG/DL — HIGH (ref 70–99)
HCT VFR BLD CALC: 34.2 % — LOW (ref 37–47)
HGB BLD-MCNC: 11.9 G/DL — LOW (ref 12–16)
IMM GRANULOCYTES NFR BLD AUTO: 1.3 % — HIGH (ref 0.1–0.3)
LYMPHOCYTES # BLD AUTO: 0.84 K/UL — LOW (ref 1.2–3.4)
LYMPHOCYTES # BLD AUTO: 10.9 % — LOW (ref 20.5–51.1)
MAGNESIUM SERPL-MCNC: 2.1 MG/DL — SIGNIFICANT CHANGE UP (ref 1.8–2.4)
MCHC RBC-ENTMCNC: 29.2 PG — SIGNIFICANT CHANGE UP (ref 27–31)
MCHC RBC-ENTMCNC: 34.8 G/DL — SIGNIFICANT CHANGE UP (ref 32–37)
MCV RBC AUTO: 83.8 FL — SIGNIFICANT CHANGE UP (ref 81–99)
MONOCYTES # BLD AUTO: 0.51 K/UL — SIGNIFICANT CHANGE UP (ref 0.1–0.6)
MONOCYTES NFR BLD AUTO: 6.6 % — SIGNIFICANT CHANGE UP (ref 1.7–9.3)
NEUTROPHILS # BLD AUTO: 6.21 K/UL — SIGNIFICANT CHANGE UP (ref 1.4–6.5)
NEUTROPHILS NFR BLD AUTO: 80.8 % — HIGH (ref 42.2–75.2)
NRBC # BLD: 0 /100 WBCS — SIGNIFICANT CHANGE UP (ref 0–0)
PARANEOPLASTIC AB PNL SER: SIGNIFICANT CHANGE UP
PLATELET # BLD AUTO: 162 K/UL — SIGNIFICANT CHANGE UP (ref 130–400)
POTASSIUM SERPL-MCNC: 4.2 MMOL/L — SIGNIFICANT CHANGE UP (ref 3.5–5)
POTASSIUM SERPL-SCNC: 4.2 MMOL/L — SIGNIFICANT CHANGE UP (ref 3.5–5)
PROT SERPL-MCNC: 8 G/DL — SIGNIFICANT CHANGE UP (ref 6–8)
RBC # BLD: 4.08 M/UL — LOW (ref 4.2–5.4)
RBC # FLD: 14.6 % — HIGH (ref 11.5–14.5)
SODIUM SERPL-SCNC: 133 MMOL/L — LOW (ref 135–146)
WBC # BLD: 7.69 K/UL — SIGNIFICANT CHANGE UP (ref 4.8–10.8)
WBC # FLD AUTO: 7.69 K/UL — SIGNIFICANT CHANGE UP (ref 4.8–10.8)

## 2018-10-30 RX ADMIN — Medication 100 MILLIGRAM(S): at 05:23

## 2018-10-30 RX ADMIN — PANTOPRAZOLE SODIUM 40 MILLIGRAM(S): 20 TABLET, DELAYED RELEASE ORAL at 09:31

## 2018-10-30 RX ADMIN — Medication 81 MILLIGRAM(S): at 11:25

## 2018-10-30 RX ADMIN — HEPARIN SODIUM 5000 UNIT(S): 5000 INJECTION INTRAVENOUS; SUBCUTANEOUS at 05:23

## 2018-10-30 RX ADMIN — HEPARIN SODIUM 5000 UNIT(S): 5000 INJECTION INTRAVENOUS; SUBCUTANEOUS at 21:59

## 2018-10-30 RX ADMIN — Medication 100 MILLIGRAM(S): at 18:06

## 2018-10-30 RX ADMIN — Medication 50 MILLIGRAM(S): at 05:23

## 2018-10-30 RX ADMIN — LISINOPRIL 40 MILLIGRAM(S): 2.5 TABLET ORAL at 05:23

## 2018-10-30 RX ADMIN — HEPARIN SODIUM 5000 UNIT(S): 5000 INJECTION INTRAVENOUS; SUBCUTANEOUS at 13:11

## 2018-10-30 RX ADMIN — Medication 40 MILLIGRAM(S): at 05:22

## 2018-10-30 RX ADMIN — Medication 100 MILLIGRAM(S): at 11:25

## 2018-10-30 RX ADMIN — POLYETHYLENE GLYCOL 3350 17 GRAM(S): 17 POWDER, FOR SOLUTION ORAL at 05:24

## 2018-10-30 RX ADMIN — ATORVASTATIN CALCIUM 40 MILLIGRAM(S): 80 TABLET, FILM COATED ORAL at 21:58

## 2018-10-30 RX ADMIN — POLYETHYLENE GLYCOL 3350 17 GRAM(S): 17 POWDER, FOR SOLUTION ORAL at 18:06

## 2018-10-30 NOTE — PROGRESS NOTE ADULT - SUBJECTIVE AND OBJECTIVE BOX
SUBJECTIVE:    Patient is a 89y old Female who presents with a chief complaint of altered mental status (29 Oct 2018 16:10)      HPI:  (((I went to see the patient this night 4:45 am, patient is barely responsive, everything written here is the information I got from the old chart from the [facility].)))(Talked with the daughter also regarding this.)    89 Y/O  with PMH of HTN, Hyperlipidemia went to the ED of [facility] for constipation. Manual disimpaction was done and patient was sent home. Next day patient's personality started changing, Became angry on , a bit combative, went to pmd where ct head was ordered. The Ct showed concern for a brain mass. She was readmitted to [facility] for further workup. Repeat CT head: decreased density in right frontal white matter which is likely related to chronic ischemic change. CT angiogram was unremarkable  MRI of the brain showed multifocal subcortical signal abnormalities with suggestion of cortical involvement. Findings are atypical for small vessel disease. Inflammatory etiology should be suspected. No mass.   urinalysis was done. Mildly positive for uti. She was started on levofloxacin but it was stopped due to a negative urine culture.   Diagnosis of metabolic encephalopathy prob 2/2 to uti was made. slurred speech resolved. (possible tia) , hypokalemia was corrected. LP was done but result has not been mentioned in papers. ECHO showed EF 75-80%, mod AS, mild AR, TR. EEG showed generalized slowing   As per daughter before this episode patient was AAOx3 and they were not satisfied with the workup in [facility] so she was transferred to [facility]. (14 Oct 2018 04:53)      Currently admitted to medicine with the primary diagnosis of      Besides the pertinent positives and negatives described above, the ROS was within normal limits.    PAST MEDICAL & SURGICAL HISTORY  Hyperlipidemia  HTN (hypertension)  No significant past surgical history    SOCIAL HISTORY:    ALLERGIES:  No Known Allergies    MEDICATIONS:  STANDING MEDICATIONS  aspirin  chewable 81 milliGRAM(s) Oral daily  atorvastatin 40 milliGRAM(s) Oral at bedtime  docusate sodium Liquid 100 milliGRAM(s) Oral two times a day  heparin  Injectable 5000 Unit(s) SubCutaneous every 8 hours  lisinopril 40 milliGRAM(s) Oral daily  metoprolol succinate ER 50 milliGRAM(s) Oral daily  pantoprazole    Tablet 40 milliGRAM(s) Oral before breakfast  polyethylene glycol 3350 17 Gram(s) Oral every 12 hours  predniSONE   Tablet 40 milliGRAM(s) Oral daily  thiamine 100 milliGRAM(s) Oral daily    PRN MEDICATIONS  acetaminophen   Tablet .. 650 milliGRAM(s) Oral every 6 hours PRN    VITALS:   T(F): 97.7  HR: 90  BP: 149/67  RR: 18  SpO2: --    LABS:                        11.9   7.69  )-----------( 162      ( 30 Oct 2018 07:57 )             34.2     10-30    133<L>  |  100  |  22<H>  ----------------------------<  129<H>  4.2   |  21  |  0.5<L>    Ca    8.5      30 Oct 2018 07:57  Mg     2.1     10-30    TPro  8.0  /  Alb  2.9<L>  /  TBili  0.8  /  DBili  x   /  AST  39  /  ALT  34  /  AlkPhos  57  10-30                  RADIOLOGY:    PHYSICAL EXAM:  GEN: No acute distress  LUNGS: Clear to auscultation bilaterally   HEART: Regular  ABD: Soft, non-tender, non-distended.  EXT: NC/NC/NE/2+PP/TEJEDA/Skin Intact.   NEURO: extremely lethargic, nonverbal, minimally responsive    Intravenous access:   NG tube:   Olivas Catheter: SUBJECTIVE:    Patient is a 89y old Female who presents with a chief complaint of altered mental status (29 Oct 2018 16:10)      HPI:    88 Y/O  with PMH of HTN, Hyperlipidemia went to the ED of [facility] for constipation. Manual disimpaction was done and patient was sent home. Next day patient's personality started changing, Became angry on , a bit combative, went to pmd where ct head was ordered. The Ct showed concern for a brain mass. She was readmitted to [facility] for further workup. Repeat CT head: decreased density in right frontal white matter which is likely related to chronic ischemic change. CT angiogram was unremarkable  MRI of the brain showed multifocal subcortical signal abnormalities with suggestion of cortical involvement. Findings are atypical for small vessel disease. Inflammatory etiology should be suspected. No mass.   urinalysis was done. Mildly positive for uti. She was started on levofloxacin but it was stopped due to a negative urine culture.   Diagnosis of metabolic encephalopathy prob 2/2 to uti was made. slurred speech resolved. (possible tia) , hypokalemia was corrected. LP was done but result has not been mentioned in papers. ECHO showed EF 75-80%, mod AS, mild AR, TR. EEG showed generalized slowing   As per daughter before this episode patient was AAOx3 and they were not satisfied with the workup in [facility] so she was transferred to [facility]. (14 Oct 2018 04:53)      Currently admitted to medicine with the primary diagnosis of      Besides the pertinent positives and negatives described above, the ROS was within normal limits.    PAST MEDICAL & SURGICAL HISTORY  Hyperlipidemia  HTN (hypertension)  No significant past surgical history    SOCIAL HISTORY:    ALLERGIES:  No Known Allergies    MEDICATIONS:  STANDING MEDICATIONS  aspirin  chewable 81 milliGRAM(s) Oral daily  atorvastatin 40 milliGRAM(s) Oral at bedtime  docusate sodium Liquid 100 milliGRAM(s) Oral two times a day  heparin  Injectable 5000 Unit(s) SubCutaneous every 8 hours  lisinopril 40 milliGRAM(s) Oral daily  metoprolol succinate ER 50 milliGRAM(s) Oral daily  pantoprazole    Tablet 40 milliGRAM(s) Oral before breakfast  polyethylene glycol 3350 17 Gram(s) Oral every 12 hours  predniSONE   Tablet 40 milliGRAM(s) Oral daily  thiamine 100 milliGRAM(s) Oral daily    PRN MEDICATIONS  acetaminophen   Tablet .. 650 milliGRAM(s) Oral every 6 hours PRN    VITALS:   T(F): 97.7  HR: 90  BP: 149/67  RR: 18  SpO2: --    LABS:                        11.9   7.69  )-----------( 162      ( 30 Oct 2018 07:57 )             34.2     10-30    133<L>  |  100  |  22<H>  ----------------------------<  129<H>  4.2   |  21  |  0.5<L>    Ca    8.5      30 Oct 2018 07:57  Mg     2.1     10-30    TPro  8.0  /  Alb  2.9<L>  /  TBili  0.8  /  DBili  x   /  AST  39  /  ALT  34  /  AlkPhos  57  10-30                  RADIOLOGY:    PHYSICAL EXAM:  GEN: No acute distress  LUNGS: Clear to auscultation bilaterally   HEART: Regular  ABD: Soft, non-tender, non-distended.  EXT: NC/NC/NE/2+PP/TEJEDA/Skin Intact.   NEURO: extremely lethargic, nonverbal, minimally responsive    Intravenous access:   NG tube:   Olivas Catheter:

## 2018-10-30 NOTE — PROGRESS NOTE ADULT - ASSESSMENT
89 yoF with worsening autoimmune encephalitis vs neurodegenerative dz    Family does not want biopsy or any other invasive measures    -ESR 61  -CT/MRI/LP/EEG/CTA showing no clear etiology  -completed course of IVIG, hyponatremia beginning to improve, patient appears to be continuing to decline  -on prednisone 40 daily    # HLD  -atorvastatin    # HTN  -lisinopril and metoprolol    # Dysphagia  -dysphagia 2 with honey thick liquids / supervised meals with no straws    Discussed treatment with family    DVT GI PPX    Dispo: long term planning, ineligible for snf as per care note

## 2018-10-31 LAB
ALBUMIN SERPL ELPH-MCNC: 2.6 G/DL — LOW (ref 3.5–5.2)
ALP SERPL-CCNC: 48 U/L — SIGNIFICANT CHANGE UP (ref 30–115)
ALT FLD-CCNC: 32 U/L — SIGNIFICANT CHANGE UP (ref 0–41)
ANION GAP SERPL CALC-SCNC: 10 MMOL/L — SIGNIFICANT CHANGE UP (ref 7–14)
AST SERPL-CCNC: 34 U/L — SIGNIFICANT CHANGE UP (ref 0–41)
BASOPHILS # BLD AUTO: 0.02 K/UL — SIGNIFICANT CHANGE UP (ref 0–0.2)
BASOPHILS NFR BLD AUTO: 0.4 % — SIGNIFICANT CHANGE UP (ref 0–1)
BILIRUB SERPL-MCNC: 0.7 MG/DL — SIGNIFICANT CHANGE UP (ref 0.2–1.2)
BUN SERPL-MCNC: 25 MG/DL — HIGH (ref 10–20)
CALCIUM SERPL-MCNC: 8.2 MG/DL — LOW (ref 8.5–10.1)
CHLORIDE SERPL-SCNC: 104 MMOL/L — SIGNIFICANT CHANGE UP (ref 98–110)
CO2 SERPL-SCNC: 23 MMOL/L — SIGNIFICANT CHANGE UP (ref 17–32)
CREAT SERPL-MCNC: 0.5 MG/DL — LOW (ref 0.7–1.5)
EOSINOPHIL # BLD AUTO: 0.01 K/UL — SIGNIFICANT CHANGE UP (ref 0–0.7)
EOSINOPHIL NFR BLD AUTO: 0.2 % — SIGNIFICANT CHANGE UP (ref 0–8)
GLUCOSE SERPL-MCNC: 100 MG/DL — HIGH (ref 70–99)
HCT VFR BLD CALC: 31.5 % — LOW (ref 37–47)
HGB BLD-MCNC: 10.7 G/DL — LOW (ref 12–16)
IMM GRANULOCYTES NFR BLD AUTO: 1.8 % — HIGH (ref 0.1–0.3)
LYMPHOCYTES # BLD AUTO: 0.89 K/UL — LOW (ref 1.2–3.4)
LYMPHOCYTES # BLD AUTO: 18 % — LOW (ref 20.5–51.1)
MAGNESIUM SERPL-MCNC: 2.1 MG/DL — SIGNIFICANT CHANGE UP (ref 1.8–2.4)
MCHC RBC-ENTMCNC: 28.6 PG — SIGNIFICANT CHANGE UP (ref 27–31)
MCHC RBC-ENTMCNC: 34 G/DL — SIGNIFICANT CHANGE UP (ref 32–37)
MCV RBC AUTO: 84.2 FL — SIGNIFICANT CHANGE UP (ref 81–99)
MONOCYTES # BLD AUTO: 0.6 K/UL — SIGNIFICANT CHANGE UP (ref 0.1–0.6)
MONOCYTES NFR BLD AUTO: 12.1 % — HIGH (ref 1.7–9.3)
NEUTROPHILS # BLD AUTO: 3.33 K/UL — SIGNIFICANT CHANGE UP (ref 1.4–6.5)
NEUTROPHILS NFR BLD AUTO: 67.5 % — SIGNIFICANT CHANGE UP (ref 42.2–75.2)
NRBC # BLD: 0 /100 WBCS — SIGNIFICANT CHANGE UP (ref 0–0)
PLATELET # BLD AUTO: 131 K/UL — SIGNIFICANT CHANGE UP (ref 130–400)
POTASSIUM SERPL-MCNC: 3.9 MMOL/L — SIGNIFICANT CHANGE UP (ref 3.5–5)
POTASSIUM SERPL-SCNC: 3.9 MMOL/L — SIGNIFICANT CHANGE UP (ref 3.5–5)
PROT SERPL-MCNC: 6.7 G/DL — SIGNIFICANT CHANGE UP (ref 6–8)
RBC # BLD: 3.74 M/UL — LOW (ref 4.2–5.4)
RBC # FLD: 14.6 % — HIGH (ref 11.5–14.5)
SODIUM SERPL-SCNC: 137 MMOL/L — SIGNIFICANT CHANGE UP (ref 135–146)
WBC # BLD: 4.94 K/UL — SIGNIFICANT CHANGE UP (ref 4.8–10.8)
WBC # FLD AUTO: 4.94 K/UL — SIGNIFICANT CHANGE UP (ref 4.8–10.8)

## 2018-10-31 RX ADMIN — Medication 100 MILLIGRAM(S): at 11:45

## 2018-10-31 RX ADMIN — HEPARIN SODIUM 5000 UNIT(S): 5000 INJECTION INTRAVENOUS; SUBCUTANEOUS at 21:11

## 2018-10-31 RX ADMIN — Medication 81 MILLIGRAM(S): at 11:45

## 2018-10-31 RX ADMIN — POLYETHYLENE GLYCOL 3350 17 GRAM(S): 17 POWDER, FOR SOLUTION ORAL at 17:40

## 2018-10-31 RX ADMIN — Medication 50 MILLIGRAM(S): at 05:49

## 2018-10-31 RX ADMIN — PANTOPRAZOLE SODIUM 40 MILLIGRAM(S): 20 TABLET, DELAYED RELEASE ORAL at 09:52

## 2018-10-31 RX ADMIN — POLYETHYLENE GLYCOL 3350 17 GRAM(S): 17 POWDER, FOR SOLUTION ORAL at 05:50

## 2018-10-31 RX ADMIN — HEPARIN SODIUM 5000 UNIT(S): 5000 INJECTION INTRAVENOUS; SUBCUTANEOUS at 05:49

## 2018-10-31 RX ADMIN — Medication 100 MILLIGRAM(S): at 05:49

## 2018-10-31 RX ADMIN — Medication 40 MILLIGRAM(S): at 05:49

## 2018-10-31 RX ADMIN — LISINOPRIL 40 MILLIGRAM(S): 2.5 TABLET ORAL at 05:50

## 2018-10-31 RX ADMIN — Medication 100 MILLIGRAM(S): at 17:40

## 2018-10-31 RX ADMIN — ATORVASTATIN CALCIUM 40 MILLIGRAM(S): 80 TABLET, FILM COATED ORAL at 21:11

## 2018-10-31 RX ADMIN — HEPARIN SODIUM 5000 UNIT(S): 5000 INJECTION INTRAVENOUS; SUBCUTANEOUS at 13:24

## 2018-10-31 NOTE — PROGRESS NOTE ADULT - ASSESSMENT
89 yoF with worsening autoimmune encephalitis vs neurodegenerative dz    Family does not want biopsy or any other invasive measures    -ESR 61  -CT/MRI/LP/EEG/CTA showing no clear etiology  -completed course of IVIG, hyponatremia beginning to improv, patient appears to be continuing to decline  -on prednisone 40 daily    # HLD  -atorvastatin    # HTN  -lisinopril and metoprolol    # Dysphagia  -dysphagia 2 with honey thick liquids / supervised meals with no straws    Discussed treatment with family    DVT GI PPX    Dispo: long term planning, ineligible for snf as per care note 89 yoF with worsening autoimmune encephalitis vs neurodegenerative dz    Family does not want biopsy or any other invasive measures    -ESR 61  -CT/MRI/LP/EEG/CTA showing no clear etiology  -completed course of IVIG, hyponatremia beginning to improve, patient appears to be continuing to decline  -on prednisone 40 daily    # HLD  -atorvastatin    # HTN  -lisinopril and metoprolol    # Dysphagia  -dysphagia 2 with honey thick liquids / supervised meals with no straws    Discussed treatment with family    DVT GI PPX    Dispo: long term planning, ineligible for snf as per care note

## 2018-10-31 NOTE — PROGRESS NOTE ADULT - ATTENDING COMMENTS
1.  Autoimmune encephalitis - continue steroids but switch to oral 40 mg/day.  2.  GI prophylaxis while on steroids.  3.  Please check IgA level.  4.  If IgA level is not low then may begin IVIG 0.4 gram/kg daily x 5 days.
Patient seen and examined independently earlier today. Case discussed with housestaff, nursing, social work, patient's family, neuro I agree with most of the resident's note, physical exam, and plan except as below. Patient feels better. Pt's mental status continues to fluctuate. Spoke to [email or web address] physicians - 14-3-3 is negative and the rest of results from LP done at [email or web address] unremarkable. Family declining the brain Bx. Cont steroids, increase the dose to 1gm QD as per neuro. Px remains guarded.
Patient seen and examined independently earlier today. Case discussed with housestaff, nursing, social work, patient's family. I agree with most of the resident's note, physical exam, and plan except as below. Patient without new complaints. Aspirations precautions. Last day of IV Ig without significant changes.
Patient seen and examined independently earlier today. Case discussed with housestaff, nursing, social work, patient, NS, neuro, family. I agree with most of the resident's note, physical exam, and plan except as below. Patient with fluctuation in level of consciousness. At times lethargic, other times AA0x3 but still very withdrawn. Cont steroids. Family declined brain biopsy. Prognosis discussed with family.
Patient seen and examined independently earlier today. Case discussed with housestaff, nursing, social work, patient, family. I agree with most of the resident's note, physical exam, and plan except as below. D/w neuro - will send off blood paraneoplastic markers and autoimmune pannel. Need to see if paraneoplastic panel was sent off at [facility] and f/u results of outstanding tests. Will consider repeat LP here. Aggressively treat constipation. Rehab
Patient seen and examined independently earlier today. Case discussed with housestaff, nursing, social work, patient, family. I agree with most of the resident's note, physical exam, and plan except as below. Patient without complaints. She is AA0x3 but slow to respond. +Lt facial droop and Lt sided weakness. Records from [facility] reviewed. Noted elevated CRP=8 and MRI finding of frontal nonspecific lesion. Neuro c/s pending. Will consider sending autoimmune encephalitis panel if neuro agrees. Cont PT.
Patient seen and examined independently earlier today. Case discussed with housestaff, nursing, social work, patient. I agree with most of the resident's note, physical exam, and plan except as below. Patient without new complaints. AA0x3 but gives 1 word answers. Lt side progressively weaker. IgA noted. D/w neuro - will start IV Ig. D/w family in details. Poor. Px. Premedicate with Tylenol
Patient seen and examined independently earlier today. Case discussed with housestaff, nursing, social work, patient's family, neuro. I agree with most of the resident's note, physical exam, and plan except as below. MRI with progression of dz. D/w family in details. Aware of poor Px. No brain Bx as per family.
Patient seen and examined independently earlier today. Case discussed with housestaff, nursing, social work, patient's family. I agree with most of the resident's note, physical exam, and plan except as below. Sodium better. Pt AA0x2 at times. Not moving and ignoring Left side. D/w neuro - will get MRI without contrast for progression of dz.
Patient seen and examined independently earlier today. Case discussed with housestaff, nursing, social work, patient. I agree with most of the resident's note, physical exam, and plan except as below. Patient without complaints. D/w family in details. Cont IV Ig (today is day 2/5). Dongrade diet as pt with increased cough during meals. Px remains poor.
Patient seen and examined independently earlier today. Case discussed with housestaff, nursing, social work, patient's family, neuro. I agree with most of the resident's note, physical exam, and plan except as below. Patient more withdrawn today. MRI with progression of dz compared to the one done at [facility]. D/w neuro - will start high dose steroids. Also d/w NS about possibility of the brain biopsy. Follow up autoimmune workup. D/w family - they are aware of guarded Px.

## 2018-10-31 NOTE — PROGRESS NOTE ADULT - SUBJECTIVE AND OBJECTIVE BOX
SUBJECTIVE:    Patient is a 89y old Female who presents with a chief complaint of altered mental status (30 Oct 2018 11:47)      HPI:  (((I went to see the patient this night 4:45 am, patient is barely responsive, everything written here is the information I got from the old chart from the [facility].)))(Talked with the daughter also regarding this.)    89 Y/O  with PMH of HTN, Hyperlipidemia went to the ED of [facility] for constipation. Manual disimpaction was done and patient was sent home. Next day patient's personality started changing, Became angry on , a bit combative, went to pmd where ct head was ordered. The Ct showed concern for a brain mass. She was readmitted to [facility] for further workup. Repeat CT head: decreased density in right frontal white matter which is likely related to chronic ischemic change. CT angiogram was unremarkable  MRI of the brain showed multifocal subcortical signal abnormalities with suggestion of cortical involvement. Findings are atypical for small vessel disease. Inflammatory etiology should be suspected. No mass.   urinalysis was done. Mildly positive for uti. She was started on levofloxacin but it was stopped due to a negative urine culture.   Diagnosis of metabolic encephalopathy prob 2/2 to uti was made. slurred speech resolved. (possible tia) , hypokalemia was corrected. LP was done but result has not been mentioned in papers. ECHO showed EF 75-80%, mod AS, mild AR, TR. EEG showed generalized slowing   As per daughter before this episode patient was AAOx3 and they were not satisfied with the workup in [facility] so she was transferred to [facility]. (14 Oct 2018 04:53)      Currently admitted to medicine with the primary diagnosis of      Besides the pertinent positives and negatives described above, the ROS was within normal limits.    PAST MEDICAL & SURGICAL HISTORY  Hyperlipidemia  HTN (hypertension)  No significant past surgical history    SOCIAL HISTORY:    ALLERGIES:  No Known Allergies    MEDICATIONS:  STANDING MEDICATIONS  aspirin  chewable 81 milliGRAM(s) Oral daily  atorvastatin 40 milliGRAM(s) Oral at bedtime  docusate sodium Liquid 100 milliGRAM(s) Oral two times a day  heparin  Injectable 5000 Unit(s) SubCutaneous every 8 hours  lisinopril 40 milliGRAM(s) Oral daily  metoprolol succinate ER 50 milliGRAM(s) Oral daily  pantoprazole    Tablet 40 milliGRAM(s) Oral before breakfast  polyethylene glycol 3350 17 Gram(s) Oral every 12 hours  predniSONE   Tablet 40 milliGRAM(s) Oral daily  thiamine 100 milliGRAM(s) Oral daily    PRN MEDICATIONS  acetaminophen   Tablet .. 650 milliGRAM(s) Oral every 6 hours PRN    VITALS:   T(F): 96.6  HR: 83  BP: 131/66  RR: 20  SpO2: --    LABS:                        10.7   4.94  )-----------( 131      ( 31 Oct 2018 05:38 )             31.5     10-31    137  |  104  |  25<H>  ----------------------------<  100<H>  3.9   |  23  |  0.5<L>    Ca    8.2<L>      31 Oct 2018 05:38  Mg     2.1     10-31    TPro  6.7  /  Alb  2.6<L>  /  TBili  0.7  /  DBili  x   /  AST  34  /  ALT  32  /  AlkPhos  48  10-31                  RADIOLOGY:    PHYSICAL EXAM:  GEN: No acute distress  LUNGS: Clear to auscultation bilaterally   HEART: Regular  ABD: Soft, non-tender, non-distended.  EXT: NC/NC/NE/2+PP/TEJEDA/Skin Intact.   NEURO: AAOX1, no change in mental status    Intravenous access:   NG tube:   Olivas Catheter:

## 2018-11-01 ENCOUNTER — TRANSCRIPTION ENCOUNTER (OUTPATIENT)
Age: 83
End: 2018-11-01

## 2018-11-01 VITALS
HEART RATE: 76 BPM | SYSTOLIC BLOOD PRESSURE: 129 MMHG | DIASTOLIC BLOOD PRESSURE: 61 MMHG | TEMPERATURE: 96 F | RESPIRATION RATE: 20 BRPM

## 2018-11-01 RX ORDER — THIAMINE MONONITRATE (VIT B1) 100 MG
1 TABLET ORAL
Qty: 0 | Refills: 0 | COMMUNITY
Start: 2018-11-01

## 2018-11-01 RX ORDER — LISINOPRIL 2.5 MG/1
1 TABLET ORAL
Qty: 0 | Refills: 0 | COMMUNITY
Start: 2018-11-01

## 2018-11-01 RX ORDER — ACETAMINOPHEN 500 MG
2 TABLET ORAL
Qty: 0 | Refills: 0 | COMMUNITY
Start: 2018-11-01

## 2018-11-01 RX ORDER — PANTOPRAZOLE SODIUM 20 MG/1
1 TABLET, DELAYED RELEASE ORAL
Qty: 0 | Refills: 0 | COMMUNITY
Start: 2018-11-01

## 2018-11-01 RX ORDER — POLYETHYLENE GLYCOL 3350 17 G/17G
17 POWDER, FOR SOLUTION ORAL
Qty: 0 | Refills: 0 | COMMUNITY
Start: 2018-11-01

## 2018-11-01 RX ORDER — RAMIPRIL 5 MG
1 CAPSULE ORAL
Qty: 0 | Refills: 0 | COMMUNITY

## 2018-11-01 RX ADMIN — Medication 81 MILLIGRAM(S): at 12:22

## 2018-11-01 RX ADMIN — Medication 100 MILLIGRAM(S): at 12:23

## 2018-11-01 RX ADMIN — LISINOPRIL 40 MILLIGRAM(S): 2.5 TABLET ORAL at 05:21

## 2018-11-01 RX ADMIN — Medication 100 MILLIGRAM(S): at 05:21

## 2018-11-01 RX ADMIN — HEPARIN SODIUM 5000 UNIT(S): 5000 INJECTION INTRAVENOUS; SUBCUTANEOUS at 05:21

## 2018-11-01 RX ADMIN — POLYETHYLENE GLYCOL 3350 17 GRAM(S): 17 POWDER, FOR SOLUTION ORAL at 05:22

## 2018-11-01 RX ADMIN — PANTOPRAZOLE SODIUM 40 MILLIGRAM(S): 20 TABLET, DELAYED RELEASE ORAL at 05:21

## 2018-11-01 RX ADMIN — Medication 50 MILLIGRAM(S): at 05:21

## 2018-11-01 RX ADMIN — Medication 40 MILLIGRAM(S): at 05:21

## 2018-11-01 NOTE — DISCHARGE NOTE ADULT - PATIENT PORTAL LINK FT
You can access the [facility] Patient Portal, offered by [facility], by registering with the following website: [email or web address]

## 2018-11-01 NOTE — DISCHARGE NOTE ADULT - MEDICATION SUMMARY - MEDICATIONS TO STOP TAKING
I will STOP taking the medications listed below when I get home from the hospital:    ramipril 10 mg oral tablet  -- 1 tab(s) by mouth 2 times a day    metoprolol succinate 50 mg oral tablet, extended release  -- 1 tab(s) by mouth once a day

## 2018-11-01 NOTE — DISCHARGE NOTE ADULT - HOSPITAL COURSE
89 Y/O  with PMH of HTN, Hyperlipidemia went to the ED of [facility] for constipation. Manual disimpaction was done and patient was sent home. Next day patient's personality started changing, Became angry on , a bit combative, went to pmd where ct head was ordered. The Ct showed concern for a brain mass. She was readmitted to [facility] for further workup. Repeat CT head: decreased density in right frontal white matter which is likely related to chronic ischemic change. CT angiogram was unremarkable  MRI of the brain showed multifocal subcortical signal abnormalities with suggestion of cortical involvement. Findings are atypical for small vessel disease. Inflammatory etiology should be suspected. No mass.   urinalysis was done. Mildly positive for uti. She was started on levofloxacin but it was stopped due to a negative urine culture.   Diagnosis of metabolic encephalopathy prob 2/2 to uti was made. slurred speech resolved. (possible tia) , hypokalemia was corrected. LP was done but result has not been mentioned in papers. ECHO showed EF 75-80%, mod AS, mild AR, TR. EEG showed generalized slowing   As per daughter before this episode patient was AAOx3 and they were not satisfied with the workup in [facility] so she was transferred to [facility].     During her course here, she had a CT head and serial MRIs showing progressing encephalitis on her right hemisphere.  Full workup was done, an LP at [facility] showed nothing conclusive, autoimmune markers showed nothing conclusive.  Patient had IVIG for 5 days with continuing progression of disease.  Goals of care have been discussed with family, advised to consider comfort measures due to continuing progression. 88 Y/O  with PMH of HTN, Hyperlipidemia went to the ED of [facility] for constipation. Manual disimpaction was done and patient was sent home. Next day patient's personality started changing, Became angry on , a bit combative, went to pmd where ct head was ordered. The Ct showed concern for a brain mass. She was readmitted to [facility] for further workup. Repeat CT head: decreased density in right frontal white matter which is likely related to chronic ischemic change. CT angiogram was unremarkable  MRI of the brain showed multifocal subcortical signal abnormalities with suggestion of cortical involvement. Findings are atypical for small vessel disease. Inflammatory etiology should be suspected. No mass.   urinalysis was done. Mildly positive for uti. She was started on levofloxacin but it was stopped due to a negative urine culture.   Diagnosis of metabolic encephalopathy prob 2/2 to uti was made. slurred speech resolved. (possible tia) , hypokalemia was corrected. LP was done but result has not been mentioned in papers. ECHO showed EF 75-80%, mod AS, mild AR, TR. EEG showed generalized slowing   As per daughter before this episode patient was AAOx3 and they were not satisfied with the workup in [facility] so she was transferred to [facility].     During her course here, she had a CT head and serial MRIs showing progressing encephalitis on her right hemisphere.  Full workup was done, an LP at [facility] showed nothing conclusive, autoimmune markers showed nothing conclusive.  Patient had 5 days of Solumedrol high dose IV, IVIG for 5 days with continuing progression of disease.  Goals of care have been discussed with family, family leaning toward comfort measures due to continuing progression.

## 2018-11-01 NOTE — PROGRESS NOTE ADULT - SUBJECTIVE AND OBJECTIVE BOX
SUBJECTIVE:    Patient is a 89y old Female who presents with a chief complaint of altered mental status (30 Oct 2018 11:47)      HPI:  88 Y/O  with PMH of HTN, Hyperlipidemia went to the ED of [facility] for constipation. Manual disimpaction was done and patient was sent home. Next day patient's personality started changing, Became angry on , a bit combative, went to pmd where ct head was ordered. The Ct showed concern for a brain mass. She was readmitted to [facility] for further workup. Repeat CT head: decreased density in right frontal white matter which is likely related to chronic ischemic change. CT angiogram was unremarkable  MRI of the brain showed multifocal subcortical signal abnormalities with suggestion of cortical involvement. Findings are atypical for small vessel disease. Inflammatory etiology should be suspected. No mass.   urinalysis was done. Mildly positive for uti. She was started on levofloxacin but it was stopped due to a negative urine culture.   Diagnosis of metabolic encephalopathy prob 2/2 to uti was made. slurred speech resolved. (possible tia) , hypokalemia was corrected. LP was done but result has not been mentioned in papers. ECHO showed EF 75-80%, mod AS, mild AR, TR. EEG showed generalized slowing   As per daughter before this episode patient was AAOx3 and they were not satisfied with the workup in [facility] so she was transferred to [facility]. (14 Oct 2018 04:53)      Currently admitted to medicine with the primary diagnosis of      Besides the pertinent positives and negatives described above, the ROS was within normal limits.    PAST MEDICAL & SURGICAL HISTORY  Hyperlipidemia  HTN (hypertension)  No significant past surgical history    SOCIAL HISTORY:    ALLERGIES:  No Known Allergies    MEDICATIONS:  STANDING MEDICATIONS  aspirin  chewable 81 milliGRAM(s) Oral daily  atorvastatin 40 milliGRAM(s) Oral at bedtime  docusate sodium Liquid 100 milliGRAM(s) Oral two times a day  heparin  Injectable 5000 Unit(s) SubCutaneous every 8 hours  lisinopril 40 milliGRAM(s) Oral daily  metoprolol succinate ER 50 milliGRAM(s) Oral daily  pantoprazole    Tablet 40 milliGRAM(s) Oral before breakfast  polyethylene glycol 3350 17 Gram(s) Oral every 12 hours  predniSONE   Tablet 40 milliGRAM(s) Oral daily  thiamine 100 milliGRAM(s) Oral daily    PRN MEDICATIONS  acetaminophen   Tablet .. 650 milliGRAM(s) Oral every 6 hours PRN    Vital Signs Last 24 Hrs  T(C): 35.7 (01 Nov 2018 15:33), Max: 36.9 (31 Oct 2018 23:28)  T(F): 96.2 (01 Nov 2018 15:33), Max: 98.5 (31 Oct 2018 23:28)  HR: 76 (01 Nov 2018 15:33) (69 - 76)  BP: 129/61 (01 Nov 2018 15:33) (110/62 - 181/77)  BP(mean): --  RR: 20 (01 Nov 2018 15:33) (18 - 20)  SpO2: 98% (01 Nov 2018 07:10) (98% - 98%)    LABS:                        10.7   4.94  )-----------( 131      ( 31 Oct 2018 05:38 )             31.5     10-31    137  |  104  |  25<H>  ----------------------------<  100<H>  3.9   |  23  |  0.5<L>    Ca    8.2<L>      31 Oct 2018 05:38  Mg     2.1     10-31    TPro  6.7  /  Alb  2.6<L>  /  TBili  0.7  /  DBili  x   /  AST  34  /  ALT  32  /  AlkPhos  48  10-31                  RADIOLOGY:    PHYSICAL EXAM:  GEN: No acute distress  LUNGS: decreased BS b/l  HEART: Regular  ABD: Soft, non-tender, non-distended., small LLQ hematoma from Heparin SQ injection  EXT: NC/NC/NE/2+PP/TEJEDA/Skin Intact.   NEURO: lethargic, Lt pino    Intravenous access:   NG tube:   Olivas Catheter:

## 2018-11-01 NOTE — PROGRESS NOTE ADULT - REASON FOR ADMISSION
altered mental status

## 2018-11-01 NOTE — DISCHARGE NOTE ADULT - CARE PLAN
Principal Discharge DX:	Encephalitis  Goal:	Comfort care  Assessment and plan of treatment:	As was discussed, please take all measures to make her as comfortable as possible. Principal Discharge DX:	Neurodegenerative disorder  Goal:	Comfort care  Assessment and plan of treatment:	Very aggressive process. Family currently wantl measures to make her as comfortable as possible. If family changes, their mind, would do brain biopsy. Principal Discharge DX:	Neurodegenerative disorder  Goal:	Comfort care  Assessment and plan of treatment:	Very aggressive process. Family currently want measures to make her as comfortable as possible. If family changes, their mind, would do brain biopsy.

## 2018-11-01 NOTE — DISCHARGE NOTE ADULT - CARE PROVIDERS DIRECT ADDRESSES
,[email or web address]

## 2018-11-01 NOTE — DISCHARGE NOTE ADULT - CARE PROVIDER_API CALL
Ernst Kumar), Neurology  [facility]  [address] [place] [address]  Phone: [phone]  Fax: [phone] Ernst Kumar), Neurology  [facility]  [address]  [place] [address]  Phone: [phone]  Fax: [phone]    Satish Mclaughlin), Surgical Physicians  [address] [place] [address]  [address]  [place] [address]  Phone: [phone]  Fax: [phone]

## 2018-11-01 NOTE — DISCHARGE NOTE ADULT - PLAN OF CARE
Comfort care As was discussed, please take all measures to make her as comfortable as possible. Very aggressive process. Family currently wantl measures to make her as comfortable as possible. If family changes, their mind, would do brain biopsy. Very aggressive process. Family currently want measures to make her as comfortable as possible. If family changes, their mind, would do brain biopsy.

## 2018-11-01 NOTE — PROGRESS NOTE ADULT - PROVIDER SPECIALTY LIST ADULT
Hospitalist
Internal Medicine
Neurology
Internal Medicine
Internal Medicine
Neurology
Internal Medicine
Neurology
Neurology

## 2018-11-01 NOTE — CHART NOTE - NSCHARTNOTEFT_GEN_A_CORE
Registered Dietitian Follow-Up (limited)     Pt. continues to eat with good appetite, tolerating dysphagia 1 pureed honey consistency fluid diet well. No GI distress noted, last BM 10/31. Labs/meds reviewed. No RD intervention at this time. Will continue to monitor pt.

## 2018-11-01 NOTE — PROGRESS NOTE ADULT - ASSESSMENT
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 Kumar - taper  prednisone to off    # HLD  -atorvastatin    # HTN  -lisinopril and metoprolol    # Dysphagia  -dysphagia 2 with honey thick liquids / supervised meals with no straws    Discussed again with family: 2 daughter,  and son. They are leaning towards hospice care and at this point interested in making pt as comfortable as possible.  Grave Px

## 2018-11-01 NOTE — DISCHARGE NOTE ADULT - MEDICATION SUMMARY - MEDICATIONS TO TAKE
I will START or STAY ON the medications listed below when I get home from the hospital:    predniSONE 10 mg oral tablet  -- 3 tabs daily for 3 days, then 2 tabs daily for 3 days, then 1 tab daily for 3 days, then stop  -- Indication: For Steroid taper    aspirin 81 mg oral tablet  -- 1 tab(s) by mouth once a day  -- Indication: For prophylaxis    acetaminophen 325 mg oral tablet  -- 2 tab(s) by mouth every 6 hours, As needed, Moderate Pain (4 - 6)  -- Indication: For pain    lisinopril 40 mg oral tablet  -- 1 tab(s) by mouth once a day  -- Indication: For HTN (hypertension)    atorvastatin 40 mg oral tablet  -- 1 tab(s) by mouth once a day (at bedtime)  -- Indication: For Cholesterol    metoprolol succinate 50 mg oral tablet, extended release  -- 1 tab(s) by mouth once a day  -- Indication: For HTN (hypertension)    docusate sodium 100 mg oral tablet  -- 1 tab(s) by mouth 2 times a day  -- Indication: For Constipation    polyethylene glycol 3350 oral powder for reconstitution  -- 17 gram(s) by mouth every 12 hours  -- Indication: For Constipation    pantoprazole 40 mg oral delayed release tablet  -- 1 tab(s) by mouth once a day (before a meal)  -- Indication: For GI prophylaxis    thiamine 100 mg oral tablet  -- 1 tab(s) by mouth once a day  -- Indication: For vitamin

## 2018-11-05 DIAGNOSIS — R41.82 ALTERED MENTAL STATUS, UNSPECIFIED: ICD-10-CM

## 2018-11-05 DIAGNOSIS — M35.9 SYSTEMIC INVOLVEMENT OF CONNECTIVE TISSUE, UNSPECIFIED: ICD-10-CM

## 2018-11-05 DIAGNOSIS — G93.41 METABOLIC ENCEPHALOPATHY: ICD-10-CM

## 2018-11-05 DIAGNOSIS — G04.81 OTHER ENCEPHALITIS AND ENCEPHALOMYELITIS: ICD-10-CM

## 2018-11-05 DIAGNOSIS — G81.94 HEMIPLEGIA, UNSPECIFIED AFFECTING LEFT NONDOMINANT SIDE: ICD-10-CM

## 2018-11-05 DIAGNOSIS — E87.1 HYPO-OSMOLALITY AND HYPONATREMIA: ICD-10-CM

## 2018-11-05 DIAGNOSIS — E78.5 HYPERLIPIDEMIA, UNSPECIFIED: ICD-10-CM

## 2018-11-05 DIAGNOSIS — Z79.82 LONG TERM (CURRENT) USE OF ASPIRIN: ICD-10-CM

## 2018-11-05 DIAGNOSIS — I10 ESSENTIAL (PRIMARY) HYPERTENSION: ICD-10-CM

## 2018-11-05 DIAGNOSIS — K59.00 CONSTIPATION, UNSPECIFIED: ICD-10-CM

## 2018-11-05 DIAGNOSIS — R13.19 OTHER DYSPHAGIA: ICD-10-CM

## 2018-11-05 DIAGNOSIS — R63.4 ABNORMAL WEIGHT LOSS: ICD-10-CM

## 2019-02-27 ENCOUNTER — APPOINTMENT (OUTPATIENT)
Dept: UROLOGY | Facility: CLINIC | Age: 84
End: 2019-02-27

## 2020-10-15 NOTE — DIETITIAN INITIAL EVALUATION ADULT. - PHYSICAL APPEARANCE
normal gait and station , no tenderness or deformities present
No ht or wt available and no previous admit, however BMI appears >18

## 2024-03-06 NOTE — DISCHARGE NOTE ADULT - VISION (WITH CORRECTIVE LENSES IF THE PATIENT USUALLY WEARS THEM):
Partially impaired: cannot see medication labels or newsprint, but can see obstacles in path, and the surrounding layout; can count fingers at arm's length 71.7